# Patient Record
Sex: FEMALE | Race: ASIAN | URBAN - METROPOLITAN AREA
[De-identification: names, ages, dates, MRNs, and addresses within clinical notes are randomized per-mention and may not be internally consistent; named-entity substitution may affect disease eponyms.]

---

## 2017-05-31 ENCOUNTER — OFFICE VISIT (OUTPATIENT)
Dept: FAMILY MEDICINE | Facility: CLINIC | Age: 42
End: 2017-05-31
Payer: COMMERCIAL

## 2017-05-31 VITALS
DIASTOLIC BLOOD PRESSURE: 76 MMHG | TEMPERATURE: 98.5 F | HEART RATE: 83 BPM | OXYGEN SATURATION: 97 % | BODY MASS INDEX: 26.98 KG/M2 | WEIGHT: 146.6 LBS | HEIGHT: 62 IN | SYSTOLIC BLOOD PRESSURE: 115 MMHG

## 2017-05-31 DIAGNOSIS — Z00.00 ROUTINE GENERAL MEDICAL EXAMINATION AT A HEALTH CARE FACILITY: ICD-10-CM

## 2017-05-31 DIAGNOSIS — Z12.31 ENCOUNTER FOR SCREENING MAMMOGRAM FOR BREAST CANCER: Primary | ICD-10-CM

## 2017-05-31 LAB
CHOLEST SERPL-MCNC: 182 MG/DL
GLUCOSE SERPL-MCNC: 85 MG/DL (ref 70–99)
HDLC SERPL-MCNC: 70 MG/DL
LDLC SERPL CALC-MCNC: 88 MG/DL
NONHDLC SERPL-MCNC: 112 MG/DL
TRIGL SERPL-MCNC: 118 MG/DL

## 2017-05-31 PROCEDURE — 82947 ASSAY GLUCOSE BLOOD QUANT: CPT | Performed by: INTERNAL MEDICINE

## 2017-05-31 PROCEDURE — 36415 COLL VENOUS BLD VENIPUNCTURE: CPT | Performed by: INTERNAL MEDICINE

## 2017-05-31 PROCEDURE — 80061 LIPID PANEL: CPT | Performed by: INTERNAL MEDICINE

## 2017-05-31 PROCEDURE — 99396 PREV VISIT EST AGE 40-64: CPT | Performed by: INTERNAL MEDICINE

## 2017-05-31 RX ORDER — NORGESTIMATE AND ETHINYL ESTRADIOL 0.25-0.035
1 KIT ORAL DAILY
Qty: 84 TABLET | Refills: 4 | Status: SHIPPED | OUTPATIENT
Start: 2017-05-31 | End: 2020-01-02

## 2017-05-31 NOTE — NURSING NOTE
"Chief Complaint   Patient presents with     Physical       Initial /76 (BP Location: Right arm, Patient Position: Chair, Cuff Size: Adult Regular)  Pulse 83  Temp 98.5  F (36.9  C) (Tympanic)  Ht 5' 1.75\" (1.568 m)  Wt 146 lb 9.6 oz (66.5 kg)  SpO2 97%  BMI 27.03 kg/m2 Estimated body mass index is 27.03 kg/(m^2) as calculated from the following:    Height as of this encounter: 5' 1.75\" (1.568 m).    Weight as of this encounter: 146 lb 9.6 oz (66.5 kg).  Medication Reconciliation: complete   Lesa HARRELL CMA (AAMA)    "

## 2017-05-31 NOTE — MR AVS SNAPSHOT
After Visit Summary   5/31/2017    Michael Marmolejo    MRN: [id number]           Patient Information     Date Of Birth          1975        Visit Information        Provider Department      5/31/2017 9:40 AM Carlos Albetro Alas MD [facility]        Today's Diagnoses     Encounter for screening mammogram for breast cancer    -  1    Routine general medical examination at a health care facility          Care Instructions      Preventive Health Recommendations  Female Ages 40 to 49    Yearly exam:     See your health care provider every year in order to  1. Review health changes.   2. Discuss preventive care.    3. Review your medicines if your doctor prescribed any.      Get a Pap test every three years (unless you have an abnormal result and your provider advises testing more often).      If you get Pap tests with HPV test, you only need to test every 5 years, unless you have an abnormal result. You do not need a Pap test if your uterus was removed (hysterectomy) and you have not had cancer.      You should be tested each year for STDs (sexually transmitted diseases), if you're at risk.       Ask your doctor if you should have a mammogram.      Have a colonoscopy (test for colon cancer) if someone in your family has had colon cancer or polyps before age 50.       Have a cholesterol test every 5 years.       Have a diabetes test (fasting glucose) after age 45. If you are at risk for diabetes, you should have this test every 3 years.    Shots: Get a flu shot each year. Get a tetanus shot every 10 years.     Nutrition:     Eat at least 5 servings of fruits and vegetables each day.    Eat whole-grain bread, whole-wheat pasta and brown rice instead of white grains and rice.    Talk to your provider about Calcium and Vitamin D.     Lifestyle    Exercise at least 150 minutes a week (an average of 30 minutes a day, 5 days a week). This will help you control your weight and prevent disease.    Limit  "alcohol to one drink per day.    No smoking.     Wear sunscreen to prevent skin cancer.    See your dentist every six months for an exam and cleaning.          Follow-ups after your visit        Future tests that were ordered for you today     Open Future Orders        Priority Expected Expires Ordered    *MA Screening Digital Bilateral Routine  2018            Who to contact     If you have questions or need follow up information about today's clinic visit or your schedule please contact [facility] directly at [phone].  Normal or non-critical lab and imaging results will be communicated to you by MOMENTFACE SROhart, letter or phone within 4 business days after the clinic has received the results. If you do not hear from us within 7 days, please contact the clinic through Riptide IOt or phone. If you have a critical or abnormal lab result, we will notify you by phone as soon as possible.  Submit refill requests through Confidex or call your pharmacy and they will forward the refill request to us. Please allow 3 business days for your refill to be completed.          Additional Information About Your Visit        Confidex Information     Confidex lets you send messages to your doctor, view your test results, renew your prescriptions, schedule appointments and more. To sign up, go to [email or web address] . Click on \"Log in\" on the left side of the screen, which will take you to the Welcome page. Then click on \"Sign up Now\" on the right side of the page.     You will be asked to enter the access code listed below, as well as some personal information. Please follow the directions to create your username and password.     Your access code is: [email or web address]  Expires: 2017  9:56 AM     Your access code will  in 90 days. If you need help or a new code, please call your [facility] or [phone].        Care EveryWhere ID     This is your Care EveryWhere ID. This could be used by other " "organizations to access your [place] medical records  [id number]        Your Vitals Were     Pulse Temperature Height Pulse Oximetry BMI (Body Mass Index)       83 98.5  F (36.9  C) (Tympanic) 5' 1.75\" (1.568 m) 97% 27.03 kg/m2        Blood Pressure from Last 3 Encounters:   05/31/17 115/76   07/22/15 122/81   02/07/08 100/60    Weight from Last 3 Encounters:   05/31/17 146 lb 9.6 oz (66.5 kg)   07/22/15 147 lb (66.7 kg)   02/07/08 137 lb 12.8 oz (62.5 kg)              We Performed the Following     Glucose     Lipid Profile          Today's Medication Changes          These changes are accurate as of: 5/31/17  9:56 AM.  If you have any questions, ask your nurse or doctor.               Stop taking these medicines if you haven't already. Please contact your care team if you have questions.     PRENATAL VITAMIN TABS   OR   Stopped by:  Carlos Alberto Alas MD                Where to get your medicines      These medications were sent to [facility] [place], [place] - [address]  [address] [facility] [address]     Phone:  [phone]     norgestimate-ethinyl estradiol 0.25-35 MG-MCG per tablet                Primary Care Provider Office Phone # Fax #    Camille Wilson [phone]       [facility] [address] [facility] [address]        Thank you!     Thank you for choosing [facility]  for your care. Our goal is always to provide you with excellent care. Hearing back from our patients is one way we can continue to improve our services. Please take a few minutes to complete the written survey that you may receive in the mail after your visit with us. Thank you!             Your Updated Medication List - Protect others around you: Learn how to safely use, store and throw away your medicines at www.disposemymeds.org.          This list is accurate as of: 5/31/17  9:56 AM.  Always use your most recent med list.                   Brand Name Dispense " Instructions for use    CALCIUM 600 PO          norgestimate-ethinyl estradiol 0.25-35 MG-MCG per tablet    SPRINTEC 28    84 tablet    Take 1 tablet by mouth daily

## 2017-05-31 NOTE — LETTER
[facility]  [address] [facility] [place] [address]  Phone: [phone]    May 31, 2017    Michael Marmolejo  [address] [place] [address]          Dear Ms. Marmolejo,    The results of your recent lab tests were : cholesterol and blood sugar look great.   Component      Latest Ref Rng & Units 5/31/2017   Cholesterol      <200 mg/dL 182   Triglycerides      <150 mg/dL 118   HDL Cholesterol      >49 mg/dL 70   LDL Cholesterol Calculated      <100 mg/dL 88   Non HDL Cholesterol      <130 mg/dL 112   Glucose      70 - 99 mg/dL 85    If you have any further questions or problems, please contact our office.    Sincerely,      Veronica Alas MD / cb

## 2017-06-19 ENCOUNTER — HOSPITAL ENCOUNTER (OUTPATIENT)
Dept: MAMMOGRAPHY | Facility: CLINIC | Age: 42
Discharge: HOME | End: 2017-06-19
Attending: INTERNAL MEDICINE | Admitting: INTERNAL MEDICINE
Payer: COMMERCIAL

## 2017-06-19 DIAGNOSIS — Z12.31 ENCOUNTER FOR SCREENING MAMMOGRAM FOR BREAST CANCER: ICD-10-CM

## 2017-06-19 PROCEDURE — G0202 SCR MAMMO BI INCL CAD: HCPCS

## 2018-09-24 ENCOUNTER — HEALTH MAINTENANCE LETTER (OUTPATIENT)
Age: 43
End: 2018-09-24

## 2019-12-20 NOTE — PROGRESS NOTES
December 20, 2019      WORK      RE:   Ashli King  [address]  [address]  [place] [address]    This is to certify that Ashli King has been under my care from 12/20/2019 and is unable to return to work until reevaluation on 12/27/2019.        SIGNATURE: __________________________________________                                                     BRIDGER Cooper APNP  [address] [place] [address]  [place] [address]  Phone: [phone]        SUBJECTIVE:     CC: Michael Marmolejo is an 42 year old woman who presents for preventive health visit.     Healthy Habits:    Do you get at least three servings of calcium containing foods daily (dairy, green leafy vegetables, etc.)? yes    Amount of exercise or daily activities, outside of work: walks every day    Problems taking medications regularly not applicable    Medication side effects: No    Have you had an eye exam in the past two years? yes    Do you see a dentist twice per year? no    Do you have sleep apnea, excessive snoring or daytime drowsiness?no        NO other concerns    Today's PHQ-2 Score:   PHQ-2 ( 1999 Pfizer) 5/31/2017 7/22/2015   Q1: Little interest or pleasure in doing things 0 0   Q2: Feeling down, depressed or hopeless 0 0   PHQ-2 Score 0 0       Abuse: Current or Past(Physical, Sexual or Emotional)- No  Do you feel safe in your environment - Yes    Social History   Substance Use Topics     Smoking status: Never Smoker     Smokeless tobacco: Not on file     Alcohol use No     The patient does not drink >3 drinks per day nor >7 drinks per week.    No results for input(s): CHOL, HDL, LDL, TRIG, CHOLHDLRATIO, NHDL in the last 53292 hours.    Reviewed orders with patient.  Reviewed health maintenance and updated orders accordingly - Yes    Mammo Decision Support:  Patient under age 50, mutual decision reflected in health maintenance.      Pertinent mammograms are reviewed under the imaging tab.  History of abnormal Pap smear: NO - age 30-65 PAP every 5 years with negative HPV co-testing recommended    Reviewed and updated as needed this visit by clinical staff  Tobacco  Allergies  Med Hx  Surg Hx  Fam Hx  Soc Hx        Reviewed and updated as needed this visit by Provider        History reviewed. No pertinent past medical history.   Past Surgical History:   Procedure Laterality Date     C/SECTION, LOW TRANSVERSE  7/2007       ROS:  C: NEGATIVE for fever, chills, change in weight  I:  "NEGATIVE for worrisome rashes, moles or lesions  E: NEGATIVE for vision changes or irritation  ENT: NEGATIVE for ear, mouth and throat problems  R: NEGATIVE for significant cough or SOB  B: NEGATIVE for masses, tenderness or discharge  CV: NEGATIVE for chest pain, palpitations or peripheral edema  GI: NEGATIVE for nausea, abdominal pain, heartburn, or change in bowel habits  : NEGATIVE for unusual urinary or vaginal symptoms. Periods are regular.  M: NEGATIVE for significant arthralgias or myalgia except for some pain and bruising on the left side of the body after falling down the stairs a couple weeks ago  N: NEGATIVE for weakness, dizziness or paresthesias      Current Outpatient Prescriptions   Medication Sig Dispense Refill     Calcium Carbonate (CALCIUM 600 PO)        norgestimate-ethinyl estradiol (SPRINTEC 28) 0.25-35 MG-MCG per tablet Take 1 tablet by mouth daily 84 tablet 4     [DISCONTINUED] norgestimate-ethinyl estradiol (SPRINTEC 28) 0.25-35 MG-MCG per tablet Take 1 tablet by mouth daily 28 tablet 0     No Known Allergies  OBJECTIVE:     /76 (BP Location: Right arm, Patient Position: Chair, Cuff Size: Adult Regular)  Pulse 83  Temp 98.5  F (36.9  C) (Tympanic)  Ht 5' 1.75\" (1.568 m)  Wt 146 lb 9.6 oz (66.5 kg)  SpO2 97%  BMI 27.03 kg/m2  EXAM:  GENERAL: healthy, alert and no distress  EYES: Eyes grossly normal to inspection, PERRL and conjunctivae and sclerae normal  HENT: ear canals and TM's normal, nose and mouth without ulcers or lesions  NECK: no adenopathy, no asymmetry, masses, or scars and thyroid normal to palpation  RESP: lungs clear to auscultation - no rales, rhonchi or wheezes  BREAST: declined  CV: regular rate and rhythm, normal S1 S2, no S3 or S4, no murmur, click or rub, no peripheral edema and peripheral pulses strong  ABDOMEN: soft, nontender, no hepatosplenomegaly, no masses and bowel sounds normal  MS: no gross musculoskeletal defects noted, no edema  SKIN: no " "suspicious lesions or rashes on skin visible on clothed exam, she declines full skin exam  NEURO: Normal strength and tone, mentation intact and speech normal  PSYCH: mentation appears normal, affect normal/bright    ASSESSMENT/PLAN:     1. Routine general medical examination at a health care facility  2. Encounter for screening mammogram for breast cancer      Pap smear: next due in 2018    Immunizations: UTD    Lab Studies: Cholesterol and glucose today    Mammogram: discussed variable recommendations for women in their 40s and she would like to continue mammogram screening, ordered    Colonoscopy: age 50     OCP refilled, no concerns with current pill.      - norgestimate-ethinyl estradiol (SPRINTEC 28) 0.25-35 MG-MCG per tablet; Take 1 tablet by mouth daily  Dispense: 84 tablet; Refill: 4      COUNSELING:   Reviewed preventive health counseling, as reflected in patient instructions     reports that she has never smoked. She does not have any smokeless tobacco history on file.    Estimated body mass index is 27.03 kg/(m^2) as calculated from the following:    Height as of this encounter: 5' 1.75\" (1.568 m).    Weight as of this encounter: 146 lb 9.6 oz (66.5 kg).   Weight management plan: Discussed healthy diet and exercise guidelines and patient will follow up in 12 months in clinic to re-evaluate.      Carlos Alberto Alas MD  [facility]  "

## 2020-01-02 ENCOUNTER — OFFICE VISIT (OUTPATIENT)
Dept: FAMILY MEDICINE | Facility: CLINIC | Age: 45
End: 2020-01-02
Payer: COMMERCIAL

## 2020-01-02 VITALS
RESPIRATION RATE: 16 BRPM | DIASTOLIC BLOOD PRESSURE: 64 MMHG | HEART RATE: 54 BPM | SYSTOLIC BLOOD PRESSURE: 102 MMHG | WEIGHT: 149.4 LBS | BODY MASS INDEX: 27.49 KG/M2 | OXYGEN SATURATION: 98 % | HEIGHT: 62 IN | TEMPERATURE: 98.3 F

## 2020-01-02 DIAGNOSIS — R63.5 WEIGHT GAIN: ICD-10-CM

## 2020-01-02 DIAGNOSIS — Z23 NEED FOR PROPHYLACTIC VACCINATION AND INOCULATION AGAINST INFLUENZA: ICD-10-CM

## 2020-01-02 DIAGNOSIS — Z30.41 ENCOUNTER FOR SURVEILLANCE OF CONTRACEPTIVE PILLS: ICD-10-CM

## 2020-01-02 DIAGNOSIS — Z00.00 ROUTINE GENERAL MEDICAL EXAMINATION AT A HEALTH CARE FACILITY: Primary | ICD-10-CM

## 2020-01-02 LAB
CHOLEST SERPL-MCNC: 119 MG/DL
HDLC SERPL-MCNC: 50 MG/DL
LDLC SERPL CALC-MCNC: 52 MG/DL
NONHDLC SERPL-MCNC: 69 MG/DL
TRIGL SERPL-MCNC: 85 MG/DL
TSH SERPL DL<=0.005 MIU/L-ACNC: 1.71 MU/L (ref 0.4–4)

## 2020-01-02 PROCEDURE — 84443 ASSAY THYROID STIM HORMONE: CPT | Performed by: NURSE PRACTITIONER

## 2020-01-02 PROCEDURE — 90471 IMMUNIZATION ADMIN: CPT | Performed by: NURSE PRACTITIONER

## 2020-01-02 PROCEDURE — 36415 COLL VENOUS BLD VENIPUNCTURE: CPT | Performed by: NURSE PRACTITIONER

## 2020-01-02 PROCEDURE — 99396 PREV VISIT EST AGE 40-64: CPT | Mod: 25 | Performed by: NURSE PRACTITIONER

## 2020-01-02 PROCEDURE — 87624 HPV HI-RISK TYP POOLED RSLT: CPT | Performed by: NURSE PRACTITIONER

## 2020-01-02 PROCEDURE — 90686 IIV4 VACC NO PRSV 0.5 ML IM: CPT | Performed by: NURSE PRACTITIONER

## 2020-01-02 PROCEDURE — 80061 LIPID PANEL: CPT | Performed by: NURSE PRACTITIONER

## 2020-01-02 PROCEDURE — G0145 SCR C/V CYTO,THINLAYER,RESCR: HCPCS | Performed by: NURSE PRACTITIONER

## 2020-01-02 RX ORDER — NORGESTIMATE AND ETHINYL ESTRADIOL 0.25-0.035
1 KIT ORAL DAILY
Qty: 84 TABLET | Refills: 4 | Status: SHIPPED | OUTPATIENT
Start: 2020-01-02 | End: 2021-02-25

## 2020-01-02 ASSESSMENT — MIFFLIN-ST. JEOR: SCORE: 1276.95

## 2020-01-02 NOTE — LETTER
January 2, 2020      Sheliakalpesh Marmolejo  [address] [address]        Dear ,    We are writing to inform you of your test results.    Cholesterol level normal.   Thyroid function normal.   Resulted Orders   Lipid panel reflex to direct LDL Fasting   Result Value Ref Range    Cholesterol 119 <200 mg/dL    Triglycerides 85 <150 mg/dL      Comment:      Fasting specimen    HDL Cholesterol 50 >49 mg/dL    LDL Cholesterol Calculated 52 <100 mg/dL      Comment:      Desirable:       <100 mg/dl    Non HDL Cholesterol 69 <130 mg/dL   TSH with free T4 reflex   Result Value Ref Range    TSH 1.71 0.40 - 4.00 mU/L       If you have any questions or concerns, please call the clinic at the number listed above.       Sincerely,        HOA Albrecht CNP

## 2020-01-02 NOTE — LETTER
January 9, 2020    Sheliakalpesh Marmolejo  [address] [facility] [place] [address]    Dear ,  This letter is regarding your recent Pap smear (cervical cancer screening) and Human Papillomavirus (HPV) test.  We are happy to inform you that your Pap smear result is normal. Cervical cancer is closely linked with certain types of HPV. Your results showed no evidence of high-risk HPV.  We recommend you have your next PAP smear in 3 years.  You will still need to return to the clinic every year for an annual exam and other preventive tests.  If you have additional questions regarding this result, please call our registered nurse, Marci at [phone].  Sincerely,    HOA Albrecht CNP //katherine

## 2020-01-02 NOTE — PROGRESS NOTES
SUBJECTIVE:   CC: Michael Marmolejo is an 44 year old woman who presents for preventive health visit.     Chief Complaint   Patient presents with     Physical     with pap      Orders     Mammogram      Blood Draw     Fasting for labs      Refill Request     BC        Healthy Habits:    Getting at least 3 servings of Calcium per day:  Yes    Bi-annual eye exam:  Yes    Dental care twice a year:  NO    Sleep apnea or symptoms of sleep apnea:  None    Diet:  Regular (no restrictions)    Frequency of exercise:  None    Duration of exercise:  N/A    Taking medications regularly:  Yes    Barriers to taking medications:  None    Medication side effects:  None    PHQ-2 Total Score:    Additional concerns today:  No    Today's PHQ-2 Score:   PHQ-2 ( 1999 Pfizer) 5/31/2017   Q1: Little interest or pleasure in doing things 0   Q2: Feeling down, depressed or hopeless 0   PHQ-2 Score 0       Abuse: Current or Past(Physical, Sexual or Emotional)- No  Do you feel safe in your environment? Yes    Social History     Tobacco Use     Smoking status: Never Smoker   Substance Use Topics     Alcohol use: No     If you drink alcohol do you typically have >3 drinks per day or >7 drinks per week? No    Alcohol Use 5/31/2017   Prescreen: >3 drinks/day or >7 drinks/week? The patient does not drink >3 drinks per day nor >7 drinks per week.       Reviewed orders with patient.  Reviewed health maintenance and updated orders accordingly - Yes  Lab work is in process  Labs reviewed in [facility]    Mammogram Screening: Patient under age 50, mutual decision reflected in health maintenance.      Pertinent mammograms are reviewed under the imaging tab.  History of abnormal Pap smear: NO - age 30- 65 PAP every 3 years recommended  PAP / HPV 7/22/2015 2/7/2008 12/21/2006   PAP NIL NIL NIL     Reviewed and updated as needed this visit by clinical staff  Tobacco  Allergies  Meds  Med Hx  Surg Hx  Fam Hx  Soc Hx        Reviewed and updated as needed this  visit by Provider            Review of Systems  CONSTITUTIONAL:POSITIVE  for weight gain and NEGATIVE  for anorexia, arthralgias, chills, fatigue, malaise, myalgias, sweats and weight loss  INTEGUMENTARU/SKIN: NEGATIVE for worrisome rashes, moles or lesions  EYES: NEGATIVE for vision changes or irritation  ENT: NEGATIVE for ear, mouth and throat problems  RESP: NEGATIVE for significant cough or SOB  BREAST: NEGATIVE for masses, tenderness or discharge  CV: NEGATIVE for chest pain, palpitations or peripheral edema  GI: NEGATIVE for nausea, abdominal pain, heartburn, or change in bowel habits  : NEGATIVE for unusual urinary or vaginal symptoms. Periods are regular.  MUSCULOSKELETAL: NEGATIVE for significant arthralgias or myalgia  NEURO: NEGATIVE for weakness, dizziness or paresthesias  PSYCHIATRIC: NEGATIVE for changes in mood or affect     OBJECTIVE:   LMP 12/31/2019   Physical Exam  GENERAL: healthy, alert and no distress  EYES: Eyes grossly normal to inspection, PERRL and conjunctivae and sclerae normal  HENT: ear canals and TM's normal, nose and mouth without ulcers or lesions  NECK: no adenopathy, no asymmetry, masses, or scars and thyroid normal to palpation  RESP: lungs clear to auscultation - no rales, rhonchi or wheezes  BREAST: normal without masses, tenderness or nipple discharge and no palpable axillary masses or adenopathy  CV: regular rate and rhythm, normal S1 S2, no S3 or S4, no murmur, click or rub, no peripheral edema and peripheral pulses strong   (female): normal female external genitalia, normal urethral meatus, vaginal mucosa, normal cervix/adnexa/uterus without masses or discharge  MS: no gross musculoskeletal defects noted, no edema  SKIN: no suspicious lesions or rashes  PSYCH: mentation appears normal, affect normal/bright    Diagnostic Test Results:  Labs reviewed in Epic    ASSESSMENT/PLAN:   1. Routine general medical examination at a health care facility    - Pap imaged thin layer  "screen with HPV - recommended age 30 - 65 years (select HPV order below)  - HPV High Risk Types DNA Cervical  - Lipid panel reflex to direct LDL Fasting  - *MA Screening Digital Bilateral; Future    2. Need for prophylactic vaccination and inoculation against influenza    - INFLUENZA VACCINE IM > 6 MONTHS VALENT IIV4 [94794]  - Vaccine Administration, Initial [42309]    3. Encounter for surveillance of contraceptive pills    - norgestimate-ethinyl estradiol (SPRINTEC 28) 0.25-35 MG-MCG tablet; Take 1 tablet by mouth daily  Dispense: 84 tablet; Refill: 4    4. Weight gain    - TSH with free T4 reflex    COUNSELING:  Reviewed preventive health counseling, as reflected in patient instructions       Regular exercise       Healthy diet/nutrition    Estimated body mass index is 27.03 kg/m  as calculated from the following:    Height as of 5/31/17: 1.568 m (5' 1.75\").    Weight as of 5/31/17: 66.5 kg (146 lb 9.6 oz).    Weight management plan: Discussed healthy diet and exercise guidelines     reports that she has never smoked. She does not have any smokeless tobacco history on file.      Counseling Resources:  ATP IV Guidelines  Pooled Cohorts Equation Calculator  Breast Cancer Risk Calculator  FRAX Risk Assessment  ICSI Preventive Guidelines  Dietary Guidelines for Americans, 2010  USDA's MyPlate  ASA Prophylaxis  Lung CA Screening    HOA Albrecht [facility]  "

## 2020-01-06 LAB
COPATH REPORT: NORMAL
PAP: NORMAL

## 2020-01-08 LAB
FINAL DIAGNOSIS: NORMAL
HPV HR 12 DNA CVX QL NAA+PROBE: NEGATIVE
HPV16 DNA SPEC QL NAA+PROBE: NEGATIVE
HPV18 DNA SPEC QL NAA+PROBE: NEGATIVE
SPECIMEN DESCRIPTION: NORMAL
SPECIMEN SOURCE CVX/VAG CYTO: NORMAL

## 2020-01-27 ENCOUNTER — HOSPITAL ENCOUNTER (OUTPATIENT)
Dept: MAMMOGRAPHY | Facility: CLINIC | Age: 45
Discharge: HOME | End: 2020-01-27
Attending: NURSE PRACTITIONER | Admitting: NURSE PRACTITIONER
Payer: COMMERCIAL

## 2020-01-27 DIAGNOSIS — Z12.31 VISIT FOR SCREENING MAMMOGRAM: ICD-10-CM

## 2020-01-27 PROCEDURE — 77063 BREAST TOMOSYNTHESIS BI: CPT

## 2020-05-22 ENCOUNTER — NURSE TRIAGE (OUTPATIENT)
Dept: NURSING | Facility: CLINIC | Age: 45
End: 2020-05-22

## 2020-05-22 ENCOUNTER — VIRTUAL VISIT (OUTPATIENT)
Dept: FAMILY MEDICINE | Facility: CLINIC | Age: 45
End: 2020-05-22
Payer: COMMERCIAL

## 2020-05-22 DIAGNOSIS — R21 RASH: Primary | ICD-10-CM

## 2020-05-22 PROCEDURE — 99213 OFFICE O/P EST LOW 20 MIN: CPT | Mod: 95 | Performed by: INTERNAL MEDICINE

## 2020-05-22 RX ORDER — TRIAMCINOLONE ACETONIDE 1 MG/G
CREAM TOPICAL 2 TIMES DAILY
Qty: 45 G | Refills: 1 | Status: SHIPPED | OUTPATIENT
Start: 2020-05-22 | End: 2020-06-05

## 2020-05-22 NOTE — PROGRESS NOTES
"Michael Marmolejo is a 45 year old female who is being evaluated via a billable video visit.      The patient has been notified of following:     \"This video visit will be conducted via a call between you and your physician/provider. We have found that certain health care needs can be provided without the need for an in-person physical exam.  This service lets us provide the care you need with a video conversation.  If a prescription is necessary we can send it directly to your pharmacy.  If lab work is needed we can place an order for that and you can then stop by our lab to have the test done at a later time.    Video visits are billed at different rates depending on your insurance coverage.  Please reach out to your insurance provider with any questions.    If during the course of the call the physician/provider feels a video visit is not appropriate, you will not be charged for this service.\"    Patient has given verbal consent for Video visit? Yes    How would you like to obtain your AVS? Mail a copy    Patient would like the video invitation sent by: Text to cell phone: [phone]    Will anyone else be joining your video visit? No    Subjective     Michael Marmloejo is a 45 year old female who presents today via video visit for the following health issues:    HPI  Rash  Onset: 1 week    Description: Pt states that it started on the right side of her face and has been spreading to the left side of her face. It is now on her neck and clavicle on the left. She also has a sore on her Left Lower cheek.   Starts as blisters then opens up.   Location: Face, Neck and left clavicle.  Started on right cheek then spread to left then to left shoulder/upper chest  Character: raised, painful, burning, red, Hot to the touch  Itching (Pruritis): YES    Progression of Symptoms:  worsening    Accompanying Signs & Symptoms:  Fever: no   Body aches or joint pain: no   Sore throat symptoms: no   Recent cold symptoms: no   Watery " "discharge, no pus    History:   Previous similar rash: no     Precipitating factors:   Exposure to similar rash: no   New exposures: had been working outside- had some rash prior to this but then rash got worse afterward, was wearing a mask  Recent travel: no     Alleviating factors:  Benadryl helped with the itching and swelling    Therapies Tried and outcome: Cortisone did not help, Benadryl helped itch and facial swelling.  Benadryl doesn't make her too tired during the day      Video Start Time: 1:13pm      Patient Active Problem List   Diagnosis     Previous  delivery, delivered     Past Surgical History:   Procedure Laterality Date     C/SECTION, LOW TRANSVERSE  2007       Social History     Tobacco Use     Smoking status: Never Smoker     Smokeless tobacco: Never Used   Substance Use Topics     Alcohol use: Yes     Comment: rare     Family History   Problem Relation Age of Onset     Asthma Mother      Cancer Father         liver, ETOH abuse         Current Outpatient Medications   Medication Sig Dispense Refill     Calcium Carbonate (CALCIUM 600 PO)        norgestimate-ethinyl estradiol (SPRINTEC 28) 0.25-35 MG-MCG tablet Take 1 tablet by mouth daily 84 tablet 4     triamcinolone (KENALOG) 0.1 % external cream Apply topically 2 times daily for 14 days 45 g 1     No Known Allergies    Reviewed and updated as needed this visit by Provider         Review of Systems   Constitutional, derm systems are negative, except as otherwise noted.      Objective    There were no vitals taken for this visit.  Estimated body mass index is 27.55 kg/m  as calculated from the following:    Height as of 20: 1.568 m (5' 1.75\").    Weight as of 20: 67.8 kg (149 lb 6.4 oz).  Physical Exam     GENERAL: Healthy, alert and no distress  EYES: Eyes grossly normal to inspection.  No discharge or erythema, or obvious scleral/conjunctival abnormalities.  RESP: No audible wheeze, cough, or visible cyanosis.  No visible " retractions or increased work of breathing.    SKIN: Red papular rash on both cheeks and going down left jaw line and onto upper left chest with some open sores  NEURO: Cranial nerves grossly intact.  Mentation and speech appropriate for age.  PSYCH: Mentation appears normal, affect normal/bright, judgement and insight intact, normal speech and appearance well-groomed.                Assessment & Plan     1. Rash    Seems most likely allergic contact dermatitis due to itch and reported vesicles.  No obvious plan exposures, had been tolerating that type of mask before.  They have changed to a different type of mask at this point.  No fevers or purulent drainage, and swelling improved with Benadryl, so less likely bacterial.  Will try triamcinolone and advised continuing Benadryl as that has been helping with itch and reducing swelling.     - triamcinolone (KENALOG) 0.1 % external cream; Apply topically 2 times daily for 14 days  Dispense: 45 g; Refill: 1         Return in about 1 week (around 5/29/2020) for Call if not improving or new symptoms develop (fevers, draining pus).    Carlos Alberto Alas MD  [facility]      Video-Visit Details    Type of service:  Video Visit    Video End Time:1:20pm    Originating Location (pt. Location): Home    Distant Location (provider location):  [facility]    Platform used for Video Visit: [employer]    No follow-ups on file.       Carlos Alberto Alas MD

## 2020-05-22 NOTE — TELEPHONE ENCOUNTER
"Spouse calling; patient present.  States patient has a rash on face that started a week ago.  Rash started on cheeks and spread down toward neck.  Rash is \"bumpy, itchy and burning\".  Cortisone cream provides no relief; Benadryl has helped as itchiness isn't as bad as it was.  One spot has \"liquid oozing out\".  Patient was working outside and was wearing a mask; rash is pretty confined to mask area.  Transferred to scheduling for Video Visit with provider.    Additional Information    Negative: Athlete's foot suspected (e.g., itchy rash of feet, especially 3rd-4th web spaces)    Negative: Insect bites suspected    Negative: Jock Itch suspected (e.g., itchy rash on upper inner thigh)    Negative: Public lice suspected (e.g., genital itching and lice or nits are seen)    Negative: Poison ivy, oak, or sumac suspected (e.g., itchy rash after contact with poison ivy)    Negative: Genital itching - female    Negative: Genital itching - male    Negative: Rectal (anus) itching    Negative: Localized rash also present    Negative: Patient sounds very sick or weak to the triager    Looks infected (e.g., spreading redness, red streak, pus)    Protocols used: ITCHING - [id number]      "

## 2020-12-06 ENCOUNTER — HEALTH MAINTENANCE LETTER (OUTPATIENT)
Age: 45
End: 2020-12-06

## 2021-02-04 ENCOUNTER — OFFICE VISIT (OUTPATIENT)
Dept: FAMILY MEDICINE | Facility: CLINIC | Age: 46
End: 2021-02-04
Payer: COMMERCIAL

## 2021-02-04 VITALS
OXYGEN SATURATION: 97 % | WEIGHT: 147 LBS | SYSTOLIC BLOOD PRESSURE: 122 MMHG | BODY MASS INDEX: 27.05 KG/M2 | RESPIRATION RATE: 16 BRPM | TEMPERATURE: 98.7 F | DIASTOLIC BLOOD PRESSURE: 84 MMHG | HEART RATE: 86 BPM | HEIGHT: 62 IN

## 2021-02-04 DIAGNOSIS — Z23 NEED FOR PROPHYLACTIC VACCINATION AND INOCULATION AGAINST INFLUENZA: ICD-10-CM

## 2021-02-04 DIAGNOSIS — G44.209 TENSION HEADACHE: ICD-10-CM

## 2021-02-04 DIAGNOSIS — Z00.00 ANNUAL PHYSICAL EXAM: Primary | ICD-10-CM

## 2021-02-04 LAB
ANION GAP SERPL CALCULATED.3IONS-SCNC: 1 MMOL/L (ref 3–14)
BUN SERPL-MCNC: 10 MG/DL (ref 7–30)
CALCIUM SERPL-MCNC: 8.9 MG/DL (ref 8.5–10.1)
CHLORIDE SERPL-SCNC: 108 MMOL/L (ref 94–109)
CHOLEST SERPL-MCNC: 210 MG/DL
CO2 SERPL-SCNC: 27 MMOL/L (ref 20–32)
CREAT SERPL-MCNC: 0.62 MG/DL (ref 0.52–1.04)
GFR SERPL CREATININE-BSD FRML MDRD: >90 ML/MIN/{1.73_M2}
GLUCOSE SERPL-MCNC: 79 MG/DL (ref 70–99)
HDLC SERPL-MCNC: 94 MG/DL
LDLC SERPL CALC-MCNC: 95 MG/DL
NONHDLC SERPL-MCNC: 116 MG/DL
POTASSIUM SERPL-SCNC: 4.1 MMOL/L (ref 3.4–5.3)
SODIUM SERPL-SCNC: 136 MMOL/L (ref 133–144)
TRIGL SERPL-MCNC: 105 MG/DL

## 2021-02-04 PROCEDURE — 80048 BASIC METABOLIC PNL TOTAL CA: CPT | Performed by: NURSE PRACTITIONER

## 2021-02-04 PROCEDURE — 90471 IMMUNIZATION ADMIN: CPT | Performed by: NURSE PRACTITIONER

## 2021-02-04 PROCEDURE — 99396 PREV VISIT EST AGE 40-64: CPT | Mod: 25 | Performed by: NURSE PRACTITIONER

## 2021-02-04 PROCEDURE — 90686 IIV4 VACC NO PRSV 0.5 ML IM: CPT | Performed by: NURSE PRACTITIONER

## 2021-02-04 PROCEDURE — 99213 OFFICE O/P EST LOW 20 MIN: CPT | Mod: 25 | Performed by: NURSE PRACTITIONER

## 2021-02-04 PROCEDURE — 36415 COLL VENOUS BLD VENIPUNCTURE: CPT | Performed by: NURSE PRACTITIONER

## 2021-02-04 PROCEDURE — 80061 LIPID PANEL: CPT | Performed by: NURSE PRACTITIONER

## 2021-02-04 ASSESSMENT — MIFFLIN-ST. JEOR: SCORE: 1265.04

## 2021-02-04 NOTE — PROGRESS NOTES
"   SUBJECTIVE:   CC: Michael Marmolejo is an 45 year old woman who presents for preventive health visit.     {Split Bill scripting  The purpose of this visit is to discuss your medical history and prevent health problems before you are sick. You may be responsible for a co-pay, coinsurance, or deductible if your visit today includes services such as checking on a sore throat, having an x-ray or lab test, or treating and evaluating a new or existing condition :712451}  Patient has been advised of split billing requirements and indicates understanding: {Yes and No:925033}  HPI  {Add if <65 person on Medicare  - Required Questions (Optional):340272}  {Outside tests to abstract? :647840}    {additional problems to add (Optional):248868}    Today's PHQ-2 Score:   PHQ-2 ( 1999 Pfizer) 5/31/2017   Q1: Little interest or pleasure in doing things 0   Q2: Feeling down, depressed or hopeless 0   PHQ-2 Score 0       Abuse: Current or Past (Physical, Sexual or Emotional) - { :786671}  Do you feel safe in your environment? { :601718}        Social History     Tobacco Use     Smoking status: Never Smoker     Smokeless tobacco: Never Used   Substance Use Topics     Alcohol use: Yes     Comment: rare     {Rooming Staff- Complete this question if Prescreen response is not shown below for today's visit. If you drink alcohol do you typically have >3 drinks per day or >7 drinks per week? (Optional):557796}    Alcohol Use 1/2/2020   Prescreen: >3 drinks/day or >7 drinks/week? No   {add AUDIT responses (Optional) (A score of 7 for adult men is an indication of hazardous drinking; a score of 8 or more is an indication of an alcohol use disorder.  A score of 7 or more for adult women is an indication of hazardous drinking or an alchohol use disorder):460130}    Any new diagnosis of family breast, ovarian, or bowel cancer? {If yes - repeat FHS-7 if not done today :773242::\"Yes\"} {Link to FHS-7 Assessment :304630}    Reviewed orders with " "patient.  Reviewed health maintenance and updated orders accordingly - { :426754::\"Yes\"}  {Chronicprobdata (optional):900916}    Breast CA Risk Screening:  No flowsheet data found.  {Pull in link for FHS-7 answers if completed after opening note (Optional) :817867}  {If any of the questions to the BCRA (FHS-7) are answered yes, consider ordering referral for genetic counseling (Optional) :989799::\"click delete button to remove this line now\"}  {AMB Mammogram Decision Support :015247}  Pertinent mammograms are reviewed under the imaging tab.    History of abnormal Pap smear: { :829089}  PAP / HPV Latest Ref Rng & Units 1/2/2020 7/22/2015 2/7/2008   PAP - NIL NIL NIL   HPV 16 DNA NEG:Negative Negative - -   HPV 18 DNA NEG:Negative Negative - -   OTHER HR HPV NEG:Negative Negative - -     Reviewed and updated as needed this visit by clinical staff                 Reviewed and updated as needed this visit by Provider                {HISTORY OPTIONS (Optional):181886}    Review of Systems  {FEMALE ROS (Optional):806890}     OBJECTIVE:   There were no vitals taken for this visit.  Physical Exam  {Exam Choices (Optional):038798}    {Diagnostic Test Results (Optional):870219::\"Diagnostic Test Results:\",\"Labs reviewed in Epic\"}    ASSESSMENT/PLAN:   {Diag Picklist:429443}    Patient has been advised of split billing requirements and indicates understanding: {YES / NO:762150::\"Yes\"}  COUNSELING:  {FEMALE COUNSELING MESSAGES:726327::\"Reviewed preventive health counseling, as reflected in patient instructions\"}    Estimated body mass index is 27.55 kg/m  as calculated from the following:    Height as of 1/2/20: 1.568 m (5' 1.75\").    Weight as of 1/2/20: 67.8 kg (149 lb 6.4 oz).    {Weight Management Plan (ACO) Complete if BMI is abnormal-  Ages 18-64  BMI >24.9.  Age 65+ with BMI <23 or >30 (Optional):005210}    She reports that she has never smoked. She has never used smokeless tobacco.      Counseling Resources:  ATP IV " Guidelines  Pooled Cohorts Equation Calculator  Breast Cancer Risk Calculator  BRCA-Related Cancer Risk Assessment: FHS-7 Tool  FRAX Risk Assessment  ICSI Preventive Guidelines  Dietary Guidelines for Americans, 2010  USDA's MyPlate  ASA Prophylaxis  Lung CA Screening    [facility]

## 2021-02-04 NOTE — PROGRESS NOTES
SUBJECTIVE:   CC: Michael Marmolejo is an 45 year old woman who presents for preventive health visit.     Chief Complaint   Patient presents with     Physical     fasting for labs     Headache     Imm/Inj     Flu Shot     Patient has been advised of split billing requirements and indicates understanding: Yes  Healthy Habits:    Getting at least 3 servings of Calcium per day:  Yes    Bi-annual eye exam:  Yes    Dental care twice a year:  Yes    Sleep apnea or symptoms of sleep apnea:  None    Diet:  Carbohydrate counting    Frequency of exercise:  6-7 days/week    Duration of exercise:  15-30 minutes    Taking medications regularly:  Yes    Barriers to taking medications:  None    Medication side effects:  None    PHQ-2 Total Score:    Additional concerns today:  Yes    Headache  Onset: about 5 days ago    Description:   Location: bilateral in the temporal area   Character: spinning feeling  Frequency:  Happened before menstruation   Duration:  If taking ibuprofen it lasted for an hour or two    Intensity: mild    Progression of Symptoms:  Just that one time.     Accompanying Signs & Symptoms:  Stiff neck: no   Neck or upper back pain: YES- sometimes lower back pain if shes bending down for a long time.   Fever: no   Sinus pressure: no   Nausea or vomiting: no   Dizziness: no   Numbness: no   Weakness: no   Visual changes: no     History:   Head trauma: no   Family history of migraines: no   Previous tests for headaches: no   Neurologist evaluations: saw a neurologist last year  Able to do daily activities: YES  Wake with a headaches: no   Do headaches wake you up: no   Daily pain medication use: no   Work/school stressors/changes: no     Precipitating factors:   Does light make it worse: no   Does sound make it worse: no     Alleviating factors:  Does sleep help: YES    Therapies Tried and outcome: Ibuprofen (Advil, Motrin) helps    Today's PHQ-2 Score:   PHQ-2 ( 1999 Pfizer) 2/4/2021   Q1: Little interest or  pleasure in doing things 0   Q2: Feeling down, depressed or hopeless 0   PHQ-2 Score 0       Abuse: Current or Past (Physical, Sexual or Emotional) - No  Do you feel safe in your environment? Yes    Social History     Tobacco Use     Smoking status: Never Smoker     Smokeless tobacco: Never Used   Substance Use Topics     Alcohol use: Yes     Comment: rare     If you drink alcohol do you typically have >3 drinks per day or >7 drinks per week? No    Alcohol Use 2/4/2021   Prescreen: >3 drinks/day or >7 drinks/week? No       Any new diagnosis of family breast, ovarian, or bowel cancer? No   Reviewed orders with patient.  Reviewed health maintenance and updated orders accordingly - Yes  BP Readings from Last 3 Encounters:   02/04/21 122/84   01/02/20 102/64   05/31/17 115/76    Wt Readings from Last 3 Encounters:   02/04/21 66.7 kg (147 lb)   01/02/20 67.8 kg (149 lb 6.4 oz)   05/31/17 66.5 kg (146 lb 9.6 oz)                    Breast CA Risk Screening:    Mammogram Screening: Recommended annual mammography  Pertinent mammograms are reviewed under the imaging tab.    History of abnormal Pap smear: NO - age 30- 65 PAP every 3 years recommended  PAP / HPV Latest Ref Rng & Units 1/2/2020 7/22/2015 2/7/2008   PAP - NIL NIL NIL   HPV 16 DNA NEG:Negative Negative - -   HPV 18 DNA NEG:Negative Negative - -   OTHER HR HPV NEG:Negative Negative - -     Reviewed and updated as needed this visit by clinical staff  Tobacco  Allergies  Meds   Med Hx  Surg Hx  Fam Hx  Soc Hx        Reviewed and updated as needed this visit by Provider  Tobacco                   Review of Systems  CONSTITUTIONAL: NEGATIVE for fever, chills, change in weight  INTEGUMENTARU/SKIN: NEGATIVE for worrisome rashes, moles or lesions  EYES: NEGATIVE for vision changes or irritation  ENT: NEGATIVE for ear, mouth and throat problems  RESP: NEGATIVE for significant cough or SOB  BREAST: NEGATIVE for masses, tenderness or discharge  CV: NEGATIVE for chest  "pain, palpitations or peripheral edema  GI: NEGATIVE for nausea, abdominal pain, heartburn, or change in bowel habits  : NEGATIVE for unusual urinary or vaginal symptoms. Periods are regular.  MUSCULOSKELETAL: NEGATIVE for significant arthralgias or myalgia  NEURO: POSITIVE for mild headaches   PSYCHIATRIC: NEGATIVE for changes in mood or affect     OBJECTIVE:   /84   Pulse 86   Temp 98.7  F (37.1  C) (Tympanic)   Resp 16   Ht 1.575 m (5' 2\")   Wt 66.7 kg (147 lb)   LMP 02/01/2021 (Exact Date)   SpO2 97%   Breastfeeding No   BMI 26.89 kg/m    Physical Exam  GENERAL: healthy, alert and no distress  EYES: Eyes grossly normal to inspection, PERRL and conjunctivae and sclerae normal  HENT: ear canals and TM's normal, nose and mouth without ulcers or lesions  NECK: no adenopathy, no asymmetry, masses, or scars and thyroid normal to palpation  RESP: lungs clear to auscultation - no rales, rhonchi or wheezes  CV: regular rate and rhythm, normal S1 S2, no S3 or S4, no murmur, click or rub, no peripheral edema and peripheral pulses strong  MS: no gross musculoskeletal defects noted, no edema  SKIN: no suspicious lesions or rashes  NEURO: Normal strength and tone, mentation intact and speech normal  PSYCH: mentation appears normal, affect normal/bright    Diagnostic Test Results:  Labs reviewed in Epic    ASSESSMENT/PLAN:   1. Annual physical exam    - Lipid panel reflex to direct LDL Fasting  - Basic metabolic panel  (Ca, Cl, CO2, Creat, Gluc, K, Na, BUN)    2. Tension headache  -mild tension headaches, lasting for one day, trigger-menses, headaches resolving with use of Tylenol, or Advil   -reassured patient  -recommended good hydration, can also try taking Advil the day before her period for prophylactic treatment     3. Need for prophylactic vaccination and inoculation against influenza    - INFLUENZA VACCINE IM > 6 MONTHS VALENT IIV4 [56059]    Patient has been advised of split billing requirements and " "indicates understanding: Yes  COUNSELING:  Reviewed preventive health counseling, as reflected in patient instructions       Regular exercise       Healthy diet/nutrition    Estimated body mass index is 26.89 kg/m  as calculated from the following:    Height as of this encounter: 1.575 m (5' 2\").    Weight as of this encounter: 66.7 kg (147 lb).    Weight management plan: Discussed healthy diet and exercise guidelines    She reports that she has never smoked. She has never used smokeless tobacco.      Counseling Resources:  ATP IV Guidelines  Pooled Cohorts Equation Calculator  Breast Cancer Risk Calculator  BRCA-Related Cancer Risk Assessment: FHS-7 Tool  FRAX Risk Assessment  ICSI Preventive Guidelines  Dietary Guidelines for Americans, 2010  USDA's MyPlate  ASA Prophylaxis  Lung CA Screening    [facility]  "

## 2021-02-23 DIAGNOSIS — Z30.41 ENCOUNTER FOR SURVEILLANCE OF CONTRACEPTIVE PILLS: ICD-10-CM

## 2021-02-23 NOTE — TELEPHONE ENCOUNTER
"Requested Prescriptions   Pending Prescriptions Disp Refills     MONO-LINYAH 0.25-35 MG-MCG tablet [Pharmacy Med Name: Mono-Linyah Oral Tablet 0.25-35 MG-MCG] 84 tablet 0     Sig: TAKE 1 TABLET BY MOUTH ONCE DAILY       Contraceptives Protocol Passed - 2/23/2021 10:07 AM        Passed - Patient is not a current smoker if age is 35 or older        Passed - Recent (12 mo) or future (30 days) visit within the authorizing provider's specialty     Patient has had an office visit with the authorizing provider or a provider within the authorizing providers department within the previous 12 mos or has a future within next 30 days. See \"Patient Info\" tab in inbasket, or \"Choose Columns\" in Meds & Orders section of the refill encounter.              Passed - Medication is active on med list        Passed - No active pregnancy on record        Passed - No positive pregnancy test in past 12 months             "

## 2021-02-25 RX ORDER — NORGESTIMATE AND ETHINYL ESTRADIOL 0.25-0.035
KIT ORAL
Qty: 84 TABLET | Refills: 3 | Status: SHIPPED | OUTPATIENT
Start: 2021-02-25 | End: 2022-01-27

## 2021-02-25 NOTE — TELEPHONE ENCOUNTER
Pateint had her annual exam on 2/4/2021 and needs this medication sent to pharmacy ASAP please.    Karla Chen, [place]

## 2021-03-30 ENCOUNTER — HOSPITAL ENCOUNTER (OUTPATIENT)
Dept: MAMMOGRAPHY | Facility: CLINIC | Age: 46
Discharge: HOME | End: 2021-03-30
Attending: NURSE PRACTITIONER | Admitting: NURSE PRACTITIONER
Payer: COMMERCIAL

## 2021-03-30 DIAGNOSIS — Z12.31 VISIT FOR SCREENING MAMMOGRAM: ICD-10-CM

## 2021-03-30 PROCEDURE — 77063 BREAST TOMOSYNTHESIS BI: CPT

## 2021-03-31 ENCOUNTER — IMMUNIZATION (OUTPATIENT)
Dept: NURSING | Facility: CLINIC | Age: 46
End: 2021-03-31
Payer: COMMERCIAL

## 2021-03-31 PROCEDURE — 91300 PR COVID VAC PFIZER DIL RECON 30 MCG/0.3 ML IM: CPT

## 2021-03-31 PROCEDURE — 0001A PR COVID VAC PFIZER DIL RECON 30 MCG/0.3 ML IM: CPT

## 2021-04-21 ENCOUNTER — IMMUNIZATION (OUTPATIENT)
Dept: NURSING | Facility: CLINIC | Age: 46
End: 2021-04-21
Attending: FAMILY MEDICINE
Payer: COMMERCIAL

## 2021-04-21 PROCEDURE — 0002A PR COVID VAC PFIZER DIL RECON 30 MCG/0.3 ML IM: CPT

## 2021-04-21 PROCEDURE — 91300 PR COVID VAC PFIZER DIL RECON 30 MCG/0.3 ML IM: CPT

## 2021-08-07 ENCOUNTER — HOSPITAL ENCOUNTER (EMERGENCY)
Facility: CLINIC | Age: 46
Discharge: HOME | End: 2021-08-07
Attending: PHYSICIAN ASSISTANT | Admitting: PHYSICIAN ASSISTANT
Payer: COMMERCIAL

## 2021-08-07 VITALS
SYSTOLIC BLOOD PRESSURE: 118 MMHG | WEIGHT: 145 LBS | HEART RATE: 89 BPM | RESPIRATION RATE: 16 BRPM | TEMPERATURE: 98 F | DIASTOLIC BLOOD PRESSURE: 83 MMHG | OXYGEN SATURATION: 97 % | BODY MASS INDEX: 26.52 KG/M2

## 2021-08-07 DIAGNOSIS — Z20.822 EXPOSURE TO COVID-19 VIRUS: ICD-10-CM

## 2021-08-07 DIAGNOSIS — Z20.822 ENCOUNTER FOR LABORATORY TESTING FOR COVID-19 VIRUS: ICD-10-CM

## 2021-08-07 LAB — SARS-COV-2 RNA RESP QL NAA+PROBE: NEGATIVE

## 2021-08-07 PROCEDURE — G0463 HOSPITAL OUTPT CLINIC VISIT: HCPCS | Performed by: PHYSICIAN ASSISTANT

## 2021-08-07 PROCEDURE — C9803 HOPD COVID-19 SPEC COLLECT: HCPCS | Performed by: PHYSICIAN ASSISTANT

## 2021-08-07 PROCEDURE — 99213 OFFICE O/P EST LOW 20 MIN: CPT | Performed by: PHYSICIAN ASSISTANT

## 2021-08-07 PROCEDURE — 87635 SARS-COV-2 COVID-19 AMP PRB: CPT | Performed by: PHYSICIAN ASSISTANT

## 2021-08-07 ASSESSMENT — ENCOUNTER SYMPTOMS
DIFFICULTY URINATING: 0
CONFUSION: 0
FEVER: 0
SHORTNESS OF BREATH: 0
HEADACHES: 0
NECK STIFFNESS: 0
EYE REDNESS: 0
COLOR CHANGE: 0
ARTHRALGIAS: 0
ABDOMINAL PAIN: 0

## 2021-08-07 NOTE — DISCHARGE INSTRUCTIONS
Increase fluids, rest, return if signs or symptoms of symptoms started.    Follow CDC guidelines with regards to any Covid exposures or symptoms.

## 2021-08-07 NOTE — ED PROVIDER NOTES
History     Chief Complaint   Patient presents with     Covid 19 Testing     pt wants to be tested for covid,  is sick. pt has no sx     HPI  Bua Carlos Marmolejo is a 46 year old female who presents today with  for covid testing. Patient has no symptoms, but  with URI symptoms for the past week. Both patient's had pfizer covid vaccines.    Allergies:  No Known Allergies    Problem List:    Patient Active Problem List    Diagnosis Date Noted     Previous  delivery, delivered 2007     Priority: Medium     Problem list name updated by automated process. Provider to review          Past Medical History:    History reviewed. No pertinent past medical history.    Past Surgical History:    Past Surgical History:   Procedure Laterality Date     C/SECTION, LOW TRANSVERSE  2007       Family History:    Family History   Problem Relation Age of Onset     Asthma Mother      Cancer Father         liver, ETOH abuse       Social History:  Marital Status:   [2]  Social History     Tobacco Use     Smoking status: Never Smoker     Smokeless tobacco: Never Used   Substance Use Topics     Alcohol use: Yes     Comment: rare     Drug use: No        Medications:    Calcium Carbonate (CALCIUM 600 PO)  Cholecalciferol (D3 VITAMIN PO)  MONO-LINYAH 0.25-35 MG-MCG tablet  Multiple Vitamins-Minerals (MULTIVITAMIN GUMMIES ADULT PO)          Review of Systems   Constitutional: Negative for fever.   HENT: Negative for congestion.    Eyes: Negative for redness.   Respiratory: Negative for shortness of breath.    Cardiovascular: Negative for chest pain.   Gastrointestinal: Negative for abdominal pain.   Genitourinary: Negative for difficulty urinating.   Musculoskeletal: Negative for arthralgias and neck stiffness.   Skin: Negative for color change.   Neurological: Negative for headaches.   Psychiatric/Behavioral: Negative for confusion.   All other systems reviewed and are negative.      Physical Exam   BP:  118/83  Pulse: 89  Temp: 98  F (36.7  C)  Resp: 16  Weight: 65.8 kg (145 lb)  SpO2: 97 %      Physical Exam  Vitals and nursing note reviewed.   Constitutional:       General: She is not in acute distress.     Appearance: Normal appearance. She is normal weight. She is not ill-appearing, toxic-appearing or diaphoretic.   HENT:      Head: Normocephalic and atraumatic.      Right Ear: Tympanic membrane and ear canal normal.      Left Ear: Tympanic membrane and ear canal normal.      Nose: Nose normal.      Mouth/Throat:      Mouth: Mucous membranes are moist.      Pharynx: Oropharynx is clear. No oropharyngeal exudate or posterior oropharyngeal erythema.   Eyes:      General: No scleral icterus.     Extraocular Movements: Extraocular movements intact.      Conjunctiva/sclera: Conjunctivae normal.      Pupils: Pupils are equal, round, and reactive to light.   Cardiovascular:      Rate and Rhythm: Normal rate and regular rhythm.      Pulses: Normal pulses.      Heart sounds: Normal heart sounds.   Pulmonary:      Effort: Pulmonary effort is normal. No respiratory distress.      Breath sounds: Normal breath sounds.   Musculoskeletal:      Cervical back: Normal range of motion and neck supple.   Lymphadenopathy:      Cervical: No cervical adenopathy.   Skin:     General: Skin is warm.      Capillary Refill: Capillary refill takes less than 2 seconds.      Findings: No bruising, erythema or rash.   Neurological:      General: No focal deficit present.      Mental Status: She is alert and oriented to person, place, and time.   Psychiatric:         Mood and Affect: Mood normal.         Behavior: Behavior normal.         Thought Content: Thought content normal.         Judgment: Judgment normal.         ED Course        Procedures             Critical Care time:  none               Results for orders placed or performed during the hospital encounter of 08/07/21 (from the past 24 hour(s))   Symptomatic COVID-19 Virus  (Coronavirus) by PCR *Canceled*    Specimen: Swab    Narrative    The following orders were created for panel order Symptomatic COVID-19 Virus (Coronavirus) by PCR.  Procedure                               Abnormality         Status                     ---------                               -----------         ------                       Please view results for these tests on the individual orders.   Asymptomatic COVID-19 Virus (Coronavirus) by PCR Nasopharyngeal    Specimen: Nasopharyngeal; Swab    Narrative    The following orders were created for panel order Asymptomatic COVID-19 Virus (Coronavirus) by PCR Nasopharyngeal.  Procedure                               Abnormality         Status                     ---------                               -----------         ------                     SARS-COV2 (COVID-19) Vir...[[id number]]  Normal              Final result                 Please view results for these tests on the individual orders.   SARS-COV2 (COVID-19) Virus RT-PCR    Specimen: Nasopharyngeal; Swab   Result Value Ref Range    SARS CoV2 PCR Negative Negative    Narrative    Testing was performed using the steff  SARS-CoV-2 & Influenza A/B Assay on the steff  Eri  System.  This test should be ordered for the detection of SARS-COV-2 in individuals who meet SARS-CoV-2 clinical and/or epidemiological criteria. Test performance is unknown in asymptomatic patients.  This test is for in vitro diagnostic use under the FDA EUA for laboratories certified under CLIA to perform moderate and/or high complexity testing. This test has not been FDA cleared or approved.  A negative test does not rule out the presence of PCR inhibitors in the specimen or target RNA in concentration below the limit of detection for the assay. The possibility of a false negative should be considered if the patient's recent exposure or clinical presentation suggests COVID-19.  [facility] are certified under the  Clinical Laboratory Improvement Amendments of 1988 (CLIA-88) as qualified to perform moderate and/or high complexity laboratory testing.       Medications - No data to display    Assessments & Plan (with Medical Decision Making)     I have reviewed the nursing notes.    I have reviewed the findings, diagnosis, plan and need for follow up with the patient.   46-year-old female presents the urgent care today with her  for Covid testing. Patient has no symptoms but states her  has had symptoms for the past week. They both have been vaccinated with the Pfizer vaccine series.  did test positive today for Covid. Patient's Covid result today was negative. Patient informed to quarantine at home per CDC guidelines with regards to covid exposure. Patient to return if symptoms develop. Patient discharged in stable condition.     New Prescriptions    No medications on file       Final diagnoses:   Encounter for laboratory testing for COVID-19 virus   Exposure to COVID-19 virus       8/7/2021   [facility] EMERGENCY DEPT     Idalia Martinez PA-C  08/07/21 [id number]

## 2021-09-25 ENCOUNTER — HEALTH MAINTENANCE LETTER (OUTPATIENT)
Age: 46
End: 2021-09-25

## 2022-01-26 DIAGNOSIS — Z30.41 ENCOUNTER FOR SURVEILLANCE OF CONTRACEPTIVE PILLS: ICD-10-CM

## 2022-01-27 RX ORDER — NORGESTIMATE AND ETHINYL ESTRADIOL 0.25-0.035
KIT ORAL
Qty: 84 TABLET | Refills: 0 | Status: SHIPPED | OUTPATIENT
Start: 2022-01-27 | End: 2022-04-20

## 2022-03-12 ENCOUNTER — HEALTH MAINTENANCE LETTER (OUTPATIENT)
Age: 47
End: 2022-03-12

## 2022-04-19 DIAGNOSIS — Z30.41 ENCOUNTER FOR SURVEILLANCE OF CONTRACEPTIVE PILLS: ICD-10-CM

## 2022-04-20 RX ORDER — NORGESTIMATE AND ETHINYL ESTRADIOL 0.25-0.035
KIT ORAL
Qty: 84 TABLET | Refills: 0 | Status: SHIPPED | OUTPATIENT
Start: 2022-04-20 | End: 2022-07-08

## 2022-04-20 NOTE — TELEPHONE ENCOUNTER
Advised patient on an appointment through [employer] message.     Routing refill request to provider for review/approval because:  Patient needs to be seen because it has been more than 1 year since last office visit.        Jordyn Watt RN BSN PHN

## 2022-07-02 ENCOUNTER — HEALTH MAINTENANCE LETTER (OUTPATIENT)
Age: 47
End: 2022-07-02

## 2022-07-13 ENCOUNTER — OFFICE VISIT (OUTPATIENT)
Dept: FAMILY MEDICINE | Facility: CLINIC | Age: 47
End: 2022-07-13
Payer: COMMERCIAL

## 2022-07-13 VITALS
WEIGHT: 151.8 LBS | OXYGEN SATURATION: 96 % | BODY MASS INDEX: 27.94 KG/M2 | SYSTOLIC BLOOD PRESSURE: 126 MMHG | HEART RATE: 84 BPM | RESPIRATION RATE: 16 BRPM | DIASTOLIC BLOOD PRESSURE: 86 MMHG | HEIGHT: 62 IN | TEMPERATURE: 98.6 F

## 2022-07-13 DIAGNOSIS — Z30.41 ENCOUNTER FOR SURVEILLANCE OF CONTRACEPTIVE PILLS: ICD-10-CM

## 2022-07-13 DIAGNOSIS — Z00.00 ANNUAL PHYSICAL EXAM: Primary | ICD-10-CM

## 2022-07-13 DIAGNOSIS — R21 RASH: ICD-10-CM

## 2022-07-13 DIAGNOSIS — Z12.31 VISIT FOR SCREENING MAMMOGRAM: ICD-10-CM

## 2022-07-13 DIAGNOSIS — Z12.4 CERVICAL CANCER SCREENING: ICD-10-CM

## 2022-07-13 LAB
ANION GAP SERPL CALCULATED.3IONS-SCNC: 5 MMOL/L (ref 3–14)
BUN SERPL-MCNC: 10 MG/DL (ref 7–30)
CALCIUM SERPL-MCNC: 8.6 MG/DL (ref 8.5–10.1)
CHLORIDE BLD-SCNC: 108 MMOL/L (ref 94–109)
CHOLEST SERPL-MCNC: 185 MG/DL
CO2 SERPL-SCNC: 26 MMOL/L (ref 20–32)
CREAT SERPL-MCNC: 0.59 MG/DL (ref 0.52–1.04)
FASTING STATUS PATIENT QL REPORTED: YES
GFR SERPL CREATININE-BSD FRML MDRD: >90 ML/MIN/1.73M2
GLUCOSE BLD-MCNC: 87 MG/DL (ref 70–99)
HDLC SERPL-MCNC: 75 MG/DL
LDLC SERPL CALC-MCNC: 74 MG/DL
NONHDLC SERPL-MCNC: 110 MG/DL
POTASSIUM BLD-SCNC: 3.9 MMOL/L (ref 3.4–5.3)
SODIUM SERPL-SCNC: 139 MMOL/L (ref 133–144)
TRIGL SERPL-MCNC: 179 MG/DL

## 2022-07-13 PROCEDURE — 80061 LIPID PANEL: CPT | Performed by: NURSE PRACTITIONER

## 2022-07-13 PROCEDURE — 99213 OFFICE O/P EST LOW 20 MIN: CPT | Mod: 25 | Performed by: NURSE PRACTITIONER

## 2022-07-13 PROCEDURE — 99396 PREV VISIT EST AGE 40-64: CPT | Performed by: NURSE PRACTITIONER

## 2022-07-13 PROCEDURE — G0145 SCR C/V CYTO,THINLAYER,RESCR: HCPCS | Performed by: NURSE PRACTITIONER

## 2022-07-13 PROCEDURE — 80048 BASIC METABOLIC PNL TOTAL CA: CPT | Performed by: NURSE PRACTITIONER

## 2022-07-13 PROCEDURE — 36415 COLL VENOUS BLD VENIPUNCTURE: CPT | Performed by: NURSE PRACTITIONER

## 2022-07-13 PROCEDURE — 87624 HPV HI-RISK TYP POOLED RSLT: CPT | Performed by: NURSE PRACTITIONER

## 2022-07-13 RX ORDER — TRIAMCINOLONE ACETONIDE 1 MG/G
CREAM TOPICAL 2 TIMES DAILY
Qty: 80 G | Refills: 3 | Status: SHIPPED | OUTPATIENT
Start: 2022-07-13

## 2022-07-13 RX ORDER — NORGESTIMATE AND ETHINYL ESTRADIOL 0.25-0.035
1 KIT ORAL DAILY
Qty: 84 TABLET | Refills: 3 | Status: SHIPPED | OUTPATIENT
Start: 2022-07-13 | End: 2023-08-03

## 2022-07-13 ASSESSMENT — ENCOUNTER SYMPTOMS
PARESTHESIAS: 0
FEVER: 0
DYSURIA: 0
COUGH: 0
HEADACHES: 0
SORE THROAT: 0
EYE PAIN: 0
HEARTBURN: 0
SHORTNESS OF BREATH: 0
NAUSEA: 0
HEMATOCHEZIA: 0
DIARRHEA: 0
HEMATURIA: 0
JOINT SWELLING: 0
DIZZINESS: 0
CHILLS: 0
CONSTIPATION: 0
FREQUENCY: 0
NERVOUS/ANXIOUS: 0
ARTHRALGIAS: 0
ABDOMINAL PAIN: 0
MYALGIAS: 0
PALPITATIONS: 0
WEAKNESS: 0
BREAST MASS: 0

## 2022-07-13 ASSESSMENT — PAIN SCALES - GENERAL: PAINLEVEL: NO PAIN (0)

## 2022-07-13 NOTE — PROGRESS NOTES
SUBJECTIVE:   CC: Michael Marmolejo is an 47 year old woman who presents for preventive health visit.     Patient has been advised of split billing requirements and indicates understanding: Yes  Healthy Habits:     Getting at least 3 servings of Calcium per day:  Yes    Bi-annual eye exam:  Yes    Dental care twice a year:  NO    Sleep apnea or symptoms of sleep apnea:  None    Diet:  Regular (no restrictions)    Frequency of exercise:  2-3 days/week    Duration of exercise:  30-45 minutes    Taking medications regularly:  Yes    Medication side effects:  None    PHQ-2 Total Score: 0    Additional concerns today:  Yes (Has a rash on her face that started yesterday. Used Triamcinolone cream that was helpful and would like a refill. )      Today's PHQ-2 Score:   PHQ-2 ( 1999 Pfizer) 7/13/2022   Q1: Little interest or pleasure in doing things 0   Q2: Feeling down, depressed or hopeless 0   PHQ-2 Score 0   PHQ-2 Total Score (12-17 Years)- Positive if 3 or more points; Administer PHQ-A if positive -   Q1: Little interest or pleasure in doing things Not at all   Q2: Feeling down, depressed or hopeless Not at all   PHQ-2 Score 0       Abuse: Current or Past (Physical, Sexual or Emotional) - No  Do you feel safe in your environment? Yes    Have you ever done Advance Care Planning? (For example, a Health Directive, POLST, or a discussion with a medical provider or your loved ones about your wishes): No, advance care planning information given to patient to review.  Patient declined advance care planning discussion at this time.    Social History     Tobacco Use     Smoking status: Never Smoker     Smokeless tobacco: Never Used   Substance Use Topics     Alcohol use: Yes     Comment: rare     If you drink alcohol do you typically have >3 drinks per day or >7 drinks per week? No    Alcohol Use 7/13/2022   Prescreen: >3 drinks/day or >7 drinks/week? No   Prescreen: >3 drinks/day or >7 drinks/week? -     Reviewed orders with  patient.  Reviewed health maintenance and updated orders accordingly - Yes    Breast Cancer Screening:    Breast CA Risk Assessment (FHS-7) 7/13/2022   Do you have a family history of breast, colon, or ovarian cancer? No / Unknown         Mammogram Screening: Recommended annual mammography  Pertinent mammograms are reviewed under the imaging tab.    History of abnormal Pap smear: NO - age 30- 65 PAP every 3 years recommended  PAP / HPV Latest Ref Rng & Units 1/2/2020 7/22/2015 2/7/2008   PAP (Historical) - NIL NIL NIL   HPV16 NEG:Negative Negative - -   HPV18 NEG:Negative Negative - -   HRHPV NEG:Negative Negative - -     Reviewed and updated as needed this visit by clinical staff   Tobacco  Allergies  Meds  Problems  Med Hx  Surg Hx  Fam Hx  Soc   Hx          Reviewed and updated as needed this visit by Provider      Problems                  Review of Systems   Constitutional: Negative for chills and fever.   HENT: Negative for congestion, ear pain, hearing loss and sore throat.    Eyes: Negative for pain and visual disturbance.   Respiratory: Negative for cough and shortness of breath.    Cardiovascular: Negative for chest pain, palpitations and peripheral edema.   Gastrointestinal: Negative for abdominal pain, constipation, diarrhea, heartburn, hematochezia and nausea.   Breasts:  Negative for tenderness, breast mass and discharge.   Genitourinary: Negative for dysuria, frequency, genital sores, hematuria, pelvic pain, urgency, vaginal bleeding and vaginal discharge.   Musculoskeletal: Negative for arthralgias, joint swelling and myalgias.   Skin: Negative for rash.   Neurological: Negative for dizziness, weakness, headaches and paresthesias.   Psychiatric/Behavioral: Negative for mood changes. The patient is not nervous/anxious.           OBJECTIVE:   /86 (BP Location: Left arm, Patient Position: Sitting, Cuff Size: Adult Regular)   Pulse 84   Temp 98.6  F (37  C) (Tympanic)   Resp 16   Ht  "1.568 m (5' 1.75\")   Wt 68.9 kg (151 lb 12.8 oz)   LMP 06/20/2022   SpO2 96%   Breastfeeding Yes   BMI 27.99 kg/m    Physical Exam  GENERAL: healthy, alert and no distress  EYES: Eyes grossly normal to inspection, PERRL and conjunctivae and sclerae normal  HENT: ear canals and TM's normal, nose and mouth without ulcers or lesions  NECK: no adenopathy, no asymmetry, masses, or scars and thyroid normal to palpation  RESP: lungs clear to auscultation - no rales, rhonchi or wheezes  CV: regular rate and rhythm, normal S1 S2, no S3 or S4, no murmur, click or rub, no peripheral edema and peripheral pulses strong   (female): normal female external genitalia, normal urethral meatus, vaginal mucosa, normal cervix/adnexa/uterus without masses or discharge  MS: no gross musculoskeletal defects noted, no edema  SKIN: slight hives on the face and upper back   NEURO: Normal strength and tone, mentation intact and speech normal  PSYCH: mentation appears normal, affect normal/bright    Diagnostic Test Results:  Labs reviewed in Epic    ASSESSMENT/PLAN:   (Z00.00) Annual physical exam  (primary encounter diagnosis)  Comment:   Plan: Pap screen with HPV - recommended age 30 - 65         years, Lipid panel reflex to direct LDL         Fasting, Basic metabolic panel  (Ca, Cl, CO2,         Creat, Gluc, K, Na, BUN)      (Z12.31) Visit for screening mammogram  Plan: MA SCREENING DIGITAL BILAT - Future  (s+30)            (Z12.4) Cervical cancer screening  Comment: pap test pending       (Z30.41) Encounter for surveillance of contraceptive pills  Comment: no side effects   Plan: norgestimate-ethinyl estradiol (ORTHO-CYCLEN)         0.25-35 MG-MCG tablet            (R21) Rash  Comment: possible allergic reaction   Plan: triamcinolone (KENALOG) 0.1 % external cream twice daily as needed for up to 2 weeks             COUNSELING:  Reviewed preventive health counseling, as reflected in patient instructions       Regular exercise       " "Healthy diet/nutrition    Estimated body mass index is 27.99 kg/m  as calculated from the following:    Height as of this encounter: 1.568 m (5' 1.75\").    Weight as of this encounter: 68.9 kg (151 lb 12.8 oz).        She reports that she has never smoked. She has never used smokeless tobacco.      Counseling Resources:  ATP IV Guidelines  Pooled Cohorts Equation Calculator  Breast Cancer Risk Calculator  BRCA-Related Cancer Risk Assessment: FHS-7 Tool  FRAX Risk Assessment  ICSI Preventive Guidelines  Dietary Guidelines for Americans, 2010  USDA's MyPlate  ASA Prophylaxis  Lung CA Screening    HOA Albrecht CNP  [facility]  "

## 2022-07-15 LAB
BKR LAB AP GYN ADEQUACY: NORMAL
BKR LAB AP GYN INTERPRETATION: NORMAL
BKR LAB AP HPV REFLEX: NORMAL
BKR LAB AP PREVIOUS ABNORMAL: NORMAL
PATH REPORT.COMMENTS IMP SPEC: NORMAL
PATH REPORT.COMMENTS IMP SPEC: NORMAL
PATH REPORT.RELEVANT HX SPEC: NORMAL

## 2022-07-19 ENCOUNTER — HOSPITAL ENCOUNTER (OUTPATIENT)
Dept: MAMMOGRAPHY | Facility: CLINIC | Age: 47
Discharge: HOME | End: 2022-07-19
Attending: NURSE PRACTITIONER | Admitting: NURSE PRACTITIONER
Payer: COMMERCIAL

## 2022-07-19 DIAGNOSIS — Z12.31 VISIT FOR SCREENING MAMMOGRAM: ICD-10-CM

## 2022-07-19 LAB
HUMAN PAPILLOMA VIRUS 16 DNA: NEGATIVE
HUMAN PAPILLOMA VIRUS 18 DNA: NEGATIVE
HUMAN PAPILLOMA VIRUS FINAL DIAGNOSIS: NORMAL
HUMAN PAPILLOMA VIRUS OTHER HR: NEGATIVE

## 2022-07-19 PROCEDURE — 77067 SCR MAMMO BI INCL CAD: CPT

## 2022-11-14 ENCOUNTER — TELEPHONE (OUTPATIENT)
Dept: FAMILY MEDICINE | Facility: CLINIC | Age: 47
End: 2022-11-14

## 2022-11-14 NOTE — LETTER
November 14, 2022      Michael Marmolejo  [id number] [id number]    Your team at [facility] cares about your health. We have reviewed your chart and based on our findings; we are making the following recommendations to better manage your health.     You are in particular need of attention regarding the following:     Call or MyChart message your clinic to schedule a colonoscopy, schedule/ a FIT Test, or order a Cologuard test. If you are unsure what type of test you need, please call your clinic and speak to clinic staff.   Colon cancer is now the second leading cause of cancer-related deaths in the [facility] for both men and women and there are over 130,000 new cases and 50,000 deaths per year from colon cancer. Colonoscopies can prevent 90-95% of these deaths. Problem lesions can be removed before they ever become cancer. This test is not only looking for cancer, but also getting rid of precancerous lesions.   If you are under/uninsured, we recommend you contact the [employer] is a free colorectal cancer screening program that provides colonoscopies for eligible under/uninsured [place] men and women. If you are interested in receiving a free colonoscopy, please call [employer] at t [phone] (mention code [employer]) to see if you're eligible. Please have them send us the results.     If you have already completed these items, please contact the clinic via phone or   [employer] so your care team can review and update your records. Thank you for   choosing [facility] for your healthcare needs. For any questions,   concerns, or to schedule an appointment please contact our clinic at [phone].    Healthy Regards,      Your [facility] Care Team

## 2022-11-14 NOTE — TELEPHONE ENCOUNTER
Patient Quality Outreach    Patient is due for the following:   Colon Cancer Screening    Next Steps:   No follow up needed at this time.    Type of outreach:    Sent letter.    Next Steps:  Reach out within 90 days via [email or web address].    Max number of attempts reached: No. Will try again in 90 days if patient still on fail list.    Questions for provider review:    None     CHLOÉ Connor LPN

## 2023-01-07 ENCOUNTER — HEALTH MAINTENANCE LETTER (OUTPATIENT)
Age: 48
End: 2023-01-07

## 2023-07-24 ENCOUNTER — TELEPHONE (OUTPATIENT)
Dept: FAMILY MEDICINE | Facility: CLINIC | Age: 48
End: 2023-07-24
Payer: COMMERCIAL

## 2023-07-24 NOTE — TELEPHONE ENCOUNTER
Pt states developed rash between upper thighs on Friday. States looks like a bruise now. Now rash spread to arm and starting on neck. Per pt they are small bumps and not hives. Pt has tried cortisone cream. States is very itchy and gets very hot in the middle of the night. Scheduled next day appt with instructions to go to ed with any worsening, hives or oral involvement.       Barry Hook RN

## 2023-07-25 ENCOUNTER — OFFICE VISIT (OUTPATIENT)
Dept: FAMILY MEDICINE | Facility: CLINIC | Age: 48
End: 2023-07-25
Payer: COMMERCIAL

## 2023-07-25 VITALS
BODY MASS INDEX: 28.05 KG/M2 | HEART RATE: 71 BPM | RESPIRATION RATE: 16 BRPM | OXYGEN SATURATION: 98 % | TEMPERATURE: 98.1 F | DIASTOLIC BLOOD PRESSURE: 80 MMHG | WEIGHT: 152.4 LBS | SYSTOLIC BLOOD PRESSURE: 118 MMHG | HEIGHT: 62 IN

## 2023-07-25 DIAGNOSIS — L30.9 DERMATITIS: Primary | ICD-10-CM

## 2023-07-25 PROCEDURE — 99213 OFFICE O/P EST LOW 20 MIN: CPT | Performed by: INTERNAL MEDICINE

## 2023-07-25 RX ORDER — METHYLPREDNISOLONE 4 MG
TABLET, DOSE PACK ORAL
Qty: 21 TABLET | Refills: 0 | Status: SHIPPED | OUTPATIENT
Start: 2023-07-25 | End: 2023-08-03

## 2023-07-25 ASSESSMENT — PAIN SCALES - GENERAL: PAINLEVEL: NO PAIN (0)

## 2023-07-25 NOTE — PATIENT INSTRUCTIONS
Health Care Maintenance  You are due for annual physical with your primary care provider    Rash  Start steroid pack  Take Loratidine (Claritin) 10 mg in AM and Certirizine (Zyretc) 10  mg at bedtime  Ok to use Diphenhydramine (Benadryl) 25-50 mg up to 4 x day as needed.  Watch for drowsiness  Ok to use Hydrocortisone cream as needed to itchy areas  Try to avoid scratching, heat, sun  Ice or oatmeal bath can calm down itching  Follow-up if rash is persisting

## 2023-07-25 NOTE — PROGRESS NOTES
Assessment & Plan     Dermatitis - unclear culprit - rash does not look like bedbugs, poison ivy or other contact dermatitis.  Distribution is unusual - inner thighs near groin, and widespread arms/legs, anterior torso.  Treatment as below, follow-up if not improving, consider timely referral to Derm  - methylPREDNISolone (MEDROL DOSEPAK) 4 MG tablet therapy pack; Follow Package Directions      Patient Instructions   Health Care Maintenance  You are due for annual physical with your primary care provider    Rash  Start steroid pack  Take Loratidine (Claritin) 10 mg in AM and Certirizine (Zyretc) 10  mg at bedtime  Ok to use Diphenhydramine (Benadryl) 25-50 mg up to 4 x day as needed.  Watch for drowsiness  Ok to use Hydrocortisone cream as needed to itchy areas  Try to avoid scratching, heat, sun  Ice or oatmeal bath can calm down itching  Follow-up if rash is persisting    Esme Mccullough, [facility]AR[facility] Farley is a 48 year old, presenting for the following health issues:  Derm Problem        7/25/2023     7:02 AM   Additional Questions   Roomed by dewayne carreno   Accompanied by self         7/25/2023     7:02 AM   Patient Reported Additional Medications   Patient reports taking the following new medications none     History of Present Illness       Reason for visit:  Rash  Symptom onset:  3-7 days ago    She eats 0-1 servings of fruits and vegetables daily.She consumes 0 sweetened beverage(s) daily.She exercises with enough effort to increase her heart rate 9 or less minutes per day.  She exercises with enough effort to increase her heart rate 3 or less days per week.   She is taking medications regularly.       Chief Complaint   Patient presents with    Derm Problem         Rash  Onset/Duration: July 21 2023  Description  Location: neck, arms, legs, abdomen   Character: blotchy, red  Itching: severe  Intensity:  severe  Progression of Symptoms:  worsening  Accompanying signs  "and symptoms:   Fever: No  Body aches or joint pain: No  Sore throat symptoms: No  Recent cold symptoms: No  History:           Previous episodes of similar rash: yes feb 2023 and June 2023 had covid then  New exposures:  None  Recent travel: No  Exposure to similar rash: No  Precipitating or alleviating factors:   Therapies tried and outcome: hydrocortisone cream -  effective and triamcinolone helpful; benadryl is helping  --rash is very itchy  --no change to personal care products - soaps, laundry, etc  --no exposure to poison ivy that she is aware of;  did  her large dog  --no one else in her home has a rash  --rash is bilateral arms, upper chest wall, bilateral arms; most concentrated is bilateral inner thighs near groin and extending down to medial knee area        Current Outpatient Medications   Medication Sig Dispense Refill    Calcium Carbonate (CALCIUM 600 PO)       Cholecalciferol (D3 VITAMIN PO)       Multiple Vitamins-Minerals (MULTIVITAMIN GUMMIES ADULT PO)       norgestimate-ethinyl estradiol (ORTHO-CYCLEN) 0.25-35 MG-MCG tablet Take 1 tablet by mouth daily 84 tablet 3    triamcinolone (KENALOG) 0.1 % external cream Apply topically 2 times daily 80 g 3           Review of Systems   Constitutional, HEENT, cardiovascular, pulmonary, gi and gu systems are negative, except as otherwise noted.      Objective    /80 (BP Location: Right arm, Patient Position: Sitting, Cuff Size: Adult Regular)   Pulse 71   Temp 98.1  F (36.7  C) (Tympanic)   Resp 16   Ht 1.57 m (5' 1.81\")   Wt 69.1 kg (152 lb 6.4 oz)   LMP 07/21/2023 (Approximate)   SpO2 98%   BMI 28.05 kg/m    Body mass index is 28.05 kg/m .  Physical Exam   GENERAL APPEARANCE: healthy, alert, and no distress  SKIN: excoriations without underlying rash present widespread bilateral arms/legs, anterior torso and bilateral inner thighs near groin                      "

## 2023-08-03 ENCOUNTER — OFFICE VISIT (OUTPATIENT)
Dept: FAMILY MEDICINE | Facility: CLINIC | Age: 48
End: 2023-08-03
Payer: COMMERCIAL

## 2023-08-03 VITALS
RESPIRATION RATE: 16 BRPM | BODY MASS INDEX: 28.05 KG/M2 | HEIGHT: 62 IN | OXYGEN SATURATION: 98 % | SYSTOLIC BLOOD PRESSURE: 114 MMHG | WEIGHT: 152.4 LBS | DIASTOLIC BLOOD PRESSURE: 82 MMHG | HEART RATE: 81 BPM | TEMPERATURE: 98.2 F

## 2023-08-03 DIAGNOSIS — I49.8 BIGEMINY: ICD-10-CM

## 2023-08-03 DIAGNOSIS — Z30.41 ENCOUNTER FOR SURVEILLANCE OF CONTRACEPTIVE PILLS: ICD-10-CM

## 2023-08-03 DIAGNOSIS — Z00.00 ANNUAL PHYSICAL EXAM: Primary | ICD-10-CM

## 2023-08-03 DIAGNOSIS — Z12.31 VISIT FOR SCREENING MAMMOGRAM: ICD-10-CM

## 2023-08-03 DIAGNOSIS — Z11.59 NEED FOR HEPATITIS C SCREENING TEST: ICD-10-CM

## 2023-08-03 DIAGNOSIS — Z12.11 SCREEN FOR COLON CANCER: ICD-10-CM

## 2023-08-03 LAB
ANION GAP SERPL CALCULATED.3IONS-SCNC: 11 MMOL/L (ref 7–15)
BUN SERPL-MCNC: 10.8 MG/DL (ref 6–20)
CALCIUM SERPL-MCNC: 9.2 MG/DL (ref 8.6–10)
CHLORIDE SERPL-SCNC: 102 MMOL/L (ref 98–107)
CHOLEST SERPL-MCNC: 170 MG/DL
CREAT SERPL-MCNC: 0.74 MG/DL (ref 0.51–0.95)
DEPRECATED HCO3 PLAS-SCNC: 25 MMOL/L (ref 22–29)
ERYTHROCYTE [DISTWIDTH] IN BLOOD BY AUTOMATED COUNT: 14.8 % (ref 10–15)
GFR SERPL CREATININE-BSD FRML MDRD: >90 ML/MIN/1.73M2
GLUCOSE SERPL-MCNC: 82 MG/DL (ref 70–99)
HCT VFR BLD AUTO: 39.1 % (ref 35–47)
HCV AB SERPL QL IA: NONREACTIVE
HDLC SERPL-MCNC: 65 MG/DL
HGB BLD-MCNC: 12.7 G/DL (ref 11.7–15.7)
LDLC SERPL CALC-MCNC: 64 MG/DL
MAGNESIUM SERPL-MCNC: 2 MG/DL (ref 1.7–2.3)
MCH RBC QN AUTO: 23.9 PG (ref 26.5–33)
MCHC RBC AUTO-ENTMCNC: 32.5 G/DL (ref 31.5–36.5)
MCV RBC AUTO: 74 FL (ref 78–100)
NONHDLC SERPL-MCNC: 105 MG/DL
PLATELET # BLD AUTO: 218 10E3/UL (ref 150–450)
POTASSIUM SERPL-SCNC: 4.1 MMOL/L (ref 3.4–5.3)
RBC # BLD AUTO: 5.31 10E6/UL (ref 3.8–5.2)
SODIUM SERPL-SCNC: 138 MMOL/L (ref 136–145)
TRIGL SERPL-MCNC: 205 MG/DL
TSH SERPL DL<=0.005 MIU/L-ACNC: 2.27 UIU/ML (ref 0.3–4.2)
WBC # BLD AUTO: 12.8 10E3/UL (ref 4–11)

## 2023-08-03 PROCEDURE — 80061 LIPID PANEL: CPT | Performed by: NURSE PRACTITIONER

## 2023-08-03 PROCEDURE — 83735 ASSAY OF MAGNESIUM: CPT | Performed by: NURSE PRACTITIONER

## 2023-08-03 PROCEDURE — 93000 ELECTROCARDIOGRAM COMPLETE: CPT | Performed by: NURSE PRACTITIONER

## 2023-08-03 PROCEDURE — 80048 BASIC METABOLIC PNL TOTAL CA: CPT | Performed by: NURSE PRACTITIONER

## 2023-08-03 PROCEDURE — 99214 OFFICE O/P EST MOD 30 MIN: CPT | Mod: 25 | Performed by: NURSE PRACTITIONER

## 2023-08-03 PROCEDURE — 99396 PREV VISIT EST AGE 40-64: CPT | Performed by: NURSE PRACTITIONER

## 2023-08-03 PROCEDURE — 85027 COMPLETE CBC AUTOMATED: CPT | Performed by: NURSE PRACTITIONER

## 2023-08-03 PROCEDURE — 36415 COLL VENOUS BLD VENIPUNCTURE: CPT | Performed by: NURSE PRACTITIONER

## 2023-08-03 PROCEDURE — 84443 ASSAY THYROID STIM HORMONE: CPT | Performed by: NURSE PRACTITIONER

## 2023-08-03 PROCEDURE — 86803 HEPATITIS C AB TEST: CPT | Performed by: NURSE PRACTITIONER

## 2023-08-03 RX ORDER — NORGESTIMATE AND ETHINYL ESTRADIOL 0.25-0.035
1 KIT ORAL DAILY
Qty: 84 TABLET | Refills: 3 | Status: SHIPPED | OUTPATIENT
Start: 2023-08-03 | End: 2024-06-18

## 2023-08-03 ASSESSMENT — ENCOUNTER SYMPTOMS
HEMATOCHEZIA: 0
EYE PAIN: 0
HEMATURIA: 0
DYSURIA: 0
DIARRHEA: 0
HEADACHES: 0
SHORTNESS OF BREATH: 0
BREAST MASS: 0
COUGH: 0
NERVOUS/ANXIOUS: 0
JOINT SWELLING: 0
ABDOMINAL PAIN: 0
WEAKNESS: 0
ARTHRALGIAS: 0
FEVER: 0
HEARTBURN: 0
SORE THROAT: 0
PARESTHESIAS: 0
FREQUENCY: 0
DIZZINESS: 0
MYALGIAS: 0
CONSTIPATION: 0
NAUSEA: 0
PALPITATIONS: 0
CHILLS: 0

## 2023-08-03 ASSESSMENT — PAIN SCALES - GENERAL: PAINLEVEL: NO PAIN (0)

## 2023-08-03 NOTE — PROGRESS NOTES
SUBJECTIVE:   CC: Martine is an 48 year old who presents for preventive health visit.       8/3/2023     7:57 AM   Additional Questions   Roomed by Beulah       Healthy Habits:     Getting at least 3 servings of Calcium per day:  Yes    Bi-annual eye exam:  Yes    Dental care twice a year:  NO    Sleep apnea or symptoms of sleep apnea:  None    Diet:  Regular (no restrictions)    Frequency of exercise:  2-3 days/week    Duration of exercise:  15-30 minutes    Taking medications regularly:  Yes    Medication side effects:  None    Additional concerns today:  No      Social History     Tobacco Use    Smoking status: Never    Smokeless tobacco: Never   Substance Use Topics    Alcohol use: Yes     Comment: rare           8/3/2023     7:58 AM   Alcohol Use   Prescreen: >3 drinks/day or >7 drinks/week? No     Reviewed orders with patient.  Reviewed health maintenance and updated orders accordingly - Yes      Breast Cancer Screenin/13/2022    10:33 AM   Breast CA Risk Assessment (FHS-7)   Do you have a family history of breast, colon, or ovarian cancer? No / Unknown       click delete button to remove this line now  Mammogram Screening: Recommended annual mammography  Pertinent mammograms are reviewed under the imaging tab.    History of abnormal Pap smear: NO - age 21-29 PAP every 3 years recommended      Latest Ref Rng & Units 2022    10:54 AM 2020     9:06 AM 2020     9:00 AM   PAP / HPV   PAP  Negative for Intraepithelial Lesion or Malignancy (NILM)      PAP (Historical)   NIL     HPV 16 DNA Negative Negative   Negative    HPV 18 DNA Negative Negative   Negative    Other HR HPV Negative Negative   Negative      Reviewed and updated as needed this visit by clinical staff    Allergies  Meds              Reviewed and updated as needed this visit by Provider                     Review of Systems   Constitutional:  Negative for chills and fever.   HENT:  Negative for congestion, ear pain, hearing  "loss and sore throat.    Eyes:  Negative for pain and visual disturbance.   Respiratory:  Negative for cough and shortness of breath.    Cardiovascular:  Negative for chest pain, palpitations and peripheral edema.   Gastrointestinal:  Negative for abdominal pain, constipation, diarrhea, heartburn, hematochezia and nausea.   Breasts:  Negative for tenderness, breast mass and discharge.   Genitourinary:  Negative for dysuria, frequency, genital sores, hematuria, pelvic pain, urgency, vaginal bleeding and vaginal discharge.   Musculoskeletal:  Negative for arthralgias, joint swelling and myalgias.   Skin:  Negative for rash.   Neurological:  Negative for dizziness, weakness, headaches and paresthesias.   Psychiatric/Behavioral:  Negative for mood changes. The patient is not nervous/anxious.           OBJECTIVE:   /82 (BP Location: Left arm, Patient Position: Sitting, Cuff Size: Adult Regular)   Pulse 81   Temp 98.2  F (36.8  C) (Tympanic)   Resp 16   Ht 1.568 m (5' 1.75\")   Wt 69.1 kg (152 lb 6.4 oz)   LMP 07/21/2023 (Approximate)   SpO2 98%   BMI 28.10 kg/m    Physical Exam  GENERAL: healthy, alert and no distress  EYES: Eyes grossly normal to inspection, PERRL and conjunctivae and sclerae normal  HENT: ear canals and TM's normal, nose and mouth without ulcers or lesions  NECK: no adenopathy, no asymmetry, masses, or scars and thyroid normal to palpation  RESP: lungs clear to auscultation - no rales, rhonchi or wheezes  CV: frequent extasystoles without compensatory pause, normal S1 S2, no S3 or S4, no murmur, click or rub, peripheral pulses strong, and no peripheral edema  MS: no gross musculoskeletal defects noted, no edema  SKIN: no suspicious lesions or rashes  NEURO: Normal strength and tone, mentation intact and speech normal  PSYCH: mentation appears normal, affect normal/bright    Diagnostic Test Results:  Labs reviewed in Epic  Results for orders placed or performed in visit on 08/03/23 (from " the past 24 hour(s))   Lipid panel reflex to direct LDL Fasting   Result Value Ref Range    Cholesterol 170 <200 mg/dL    Triglycerides 205 (H) <150 mg/dL    Direct Measure HDL 65 >=50 mg/dL    LDL Cholesterol Calculated 64 <=100 mg/dL    Non HDL Cholesterol 105 <130 mg/dL    Narrative    Cholesterol  Desirable:  <200 mg/dL    Triglycerides  Normal:  Less than 150 mg/dL  Borderline High:  150-199 mg/dL  High:  200-499 mg/dL  Very High:  Greater than or equal to 500 mg/dL    Direct Measure HDL  Female:  Greater than or equal to 50 mg/dL   Male:  Greater than or equal to 40 mg/dL    LDL Cholesterol  Desirable:  <100mg/dL  Above Desirable:  100-129 mg/dL   Borderline High:  130-159 mg/dL   High:  160-189 mg/dL   Very High:  >= 190 mg/dL    Non HDL Cholesterol  Desirable:  130 mg/dL  Above Desirable:  130-159 mg/dL  Borderline High:  160-189 mg/dL  High:  190-219 mg/dL  Very High:  Greater than or equal to 220 mg/dL   Basic metabolic panel  (Ca, Cl, CO2, Creat, Gluc, K, Na, BUN)   Result Value Ref Range    Sodium 138 136 - 145 mmol/L    Potassium 4.1 3.4 - 5.3 mmol/L    Chloride 102 98 - 107 mmol/L    Carbon Dioxide (CO2) 25 22 - 29 mmol/L    Anion Gap 11 7 - 15 mmol/L    Urea Nitrogen 10.8 6.0 - 20.0 mg/dL    Creatinine 0.74 0.51 - 0.95 mg/dL    Calcium 9.2 8.6 - 10.0 mg/dL    Glucose 82 70 - 99 mg/dL    GFR Estimate >90 >60 mL/min/1.73m2   TSH with free T4 reflex   Result Value Ref Range    TSH 2.27 0.30 - 4.20 uIU/mL   Magnesium   Result Value Ref Range    Magnesium 2.0 1.7 - 2.3 mg/dL   CBC with platelets   Result Value Ref Range    WBC Count 12.8 (H) 4.0 - 11.0 10e3/uL    RBC Count 5.31 (H) 3.80 - 5.20 10e6/uL    Hemoglobin 12.7 11.7 - 15.7 g/dL    Hematocrit 39.1 35.0 - 47.0 %    MCV 74 (L) 78 - 100 fL    MCH 23.9 (L) 26.5 - 33.0 pg    MCHC 32.5 31.5 - 36.5 g/dL    RDW 14.8 10.0 - 15.0 %    Platelet Count 218 150 - 450 10e3/uL       ASSESSMENT/PLAN:   (Z00.00) Annual physical exam  (primary encounter  "diagnosis)  Plan: Lipid panel reflex to direct LDL Fasting,          (Z12.11) Screen for colon cancer  Plan: Fecal colorectal cancer screen (FIT), Fecal         colorectal cancer screen FIT       (Z11.59) Need for hepatitis C screening test  Plan: Hepatitis C Screen Reflex to HCV RNA Quant and         Genotype      (Z12.31) Visit for screening mammogram  Plan: MA SCREENING DIGITAL BILAT - Future  (s+30)      (Z30.41) Encounter for surveillance of contraceptive pills  Comment: no side effects, continue as prescribed   Plan: norgestimate-ethinyl estradiol (ORTHO-CYCLEN)         0.25-35 MG-MCG tablet            (I49.8) Calvin  Comment: patient is asymptomatic, states she is feeling well, but did not sleep well last night, worries about her daughter who is planning to move out of state to attend college. Labs normal, EKG also showed possible atrial enlargement, recommended heart ECHO for additional evaluation   Plan: EKG 12-lead complete w/read - Clinics, Basic         metabolic panel  (Ca, Cl, CO2, Creat, Gluc, K,         Na, BUN), TSH with free T4 reflex, Magnesium,         CBC with platelets, Echocardiogram Complete            COUNSELING:  Reviewed preventive health counseling, as reflected in patient instructions       Regular exercise       Healthy diet/nutrition      BMI:   Estimated body mass index is 28.1 kg/m  as calculated from the following:    Height as of this encounter: 1.568 m (5' 1.75\").    Weight as of this encounter: 69.1 kg (152 lb 6.4 oz).         She reports that she has never smoked. She has never used smokeless tobacco.          Kim Fraser, HOA CNP  [facility]  "

## 2023-08-04 ENCOUNTER — ANCILLARY PROCEDURE (OUTPATIENT)
Dept: CARDIOLOGY | Facility: CLINIC | Age: 48
End: 2023-08-04
Attending: NURSE PRACTITIONER
Payer: COMMERCIAL

## 2023-08-04 DIAGNOSIS — I49.8 BIGEMINY: Primary | ICD-10-CM

## 2023-08-04 DIAGNOSIS — I49.8 BIGEMINY: ICD-10-CM

## 2023-08-04 DIAGNOSIS — R93.1 ABNORMAL ECHOCARDIOGRAM: ICD-10-CM

## 2023-08-04 LAB — LVEF ECHO: NORMAL

## 2023-08-04 PROCEDURE — 93306 TTE W/DOPPLER COMPLETE: CPT | Mod: GC | Performed by: INTERNAL MEDICINE

## 2023-08-05 ENCOUNTER — MYC MEDICAL ADVICE (OUTPATIENT)
Dept: FAMILY MEDICINE | Facility: CLINIC | Age: 48
End: 2023-08-05
Payer: COMMERCIAL

## 2023-08-11 ENCOUNTER — LAB (OUTPATIENT)
Dept: LAB | Facility: CLINIC | Age: 48
End: 2023-08-11
Payer: COMMERCIAL

## 2023-08-11 DIAGNOSIS — Z12.11 SCREEN FOR COLON CANCER: ICD-10-CM

## 2023-08-11 LAB — HEMOCCULT STL QL IA: NEGATIVE

## 2023-08-11 PROCEDURE — 82274 ASSAY TEST FOR BLOOD FECAL: CPT

## 2023-08-17 ENCOUNTER — OFFICE VISIT (OUTPATIENT)
Dept: CARDIOLOGY | Facility: CLINIC | Age: 48
End: 2023-08-17
Payer: COMMERCIAL

## 2023-08-17 ENCOUNTER — HOSPITAL ENCOUNTER (OUTPATIENT)
Dept: CARDIOLOGY | Facility: CLINIC | Age: 48
Discharge: HOME | End: 2023-08-17
Attending: INTERNAL MEDICINE | Admitting: INTERNAL MEDICINE
Payer: COMMERCIAL

## 2023-08-17 VITALS
OXYGEN SATURATION: 97 % | DIASTOLIC BLOOD PRESSURE: 80 MMHG | WEIGHT: 152 LBS | HEART RATE: 65 BPM | SYSTOLIC BLOOD PRESSURE: 119 MMHG | BODY MASS INDEX: 28.03 KG/M2

## 2023-08-17 DIAGNOSIS — I49.8 BIGEMINY: ICD-10-CM

## 2023-08-17 DIAGNOSIS — I42.9 CARDIOMYOPATHY, UNSPECIFIED TYPE (H): ICD-10-CM

## 2023-08-17 DIAGNOSIS — O34.219 PREVIOUS CESAREAN DELIVERY, DELIVERED: Primary | ICD-10-CM

## 2023-08-17 PROCEDURE — 93246 EXT ECG>7D<15D RECORDING: CPT

## 2023-08-17 PROCEDURE — 93248 EXT ECG>7D<15D REV&INTERPJ: CPT | Performed by: INTERNAL MEDICINE

## 2023-08-17 PROCEDURE — 93005 ELECTROCARDIOGRAM TRACING: CPT | Mod: 59 | Performed by: INTERNAL MEDICINE

## 2023-08-17 PROCEDURE — 99204 OFFICE O/P NEW MOD 45 MIN: CPT | Mod: 25 | Performed by: INTERNAL MEDICINE

## 2023-08-17 NOTE — PROGRESS NOTES
HPI:     This is a 48 yr old with no major PMH here for evaluation. I see her partner in clinic. No dizziness or syncope. No SOB or CP. EKG at PCP office showed bigeminy. Echo with left atrial enlargement and low normal EF. She reports some leg swelling that seems new.    No fam history. No etoh use. No HTN history.     ASSESSMENT/PLAN:    Cardiomyopathy: low normal EF, 50%.   -check ziopatch for PVC burden, other arrhythmias   -cardiac MRI to evaluate further left atrial enlargement, myopathy   -depending on results will start appropriate medical therapy (beta blocker, ACEI)    Follow up RODERICK in 2 months    Saumya Dennison MD [facility]      PAST MEDICAL HISTORY  No past medical history on file.    CURRENT MEDICATIONS  Current Outpatient Medications   Medication Sig Dispense Refill    Cholecalciferol (D3 VITAMIN PO)       Multiple Vitamins-Minerals (MULTIVITAMIN GUMMIES ADULT PO)       norgestimate-ethinyl estradiol (ORTHO-CYCLEN) 0.25-35 MG-MCG tablet Take 1 tablet by mouth daily 84 tablet 3    triamcinolone (KENALOG) 0.1 % external cream Apply topically 2 times daily 80 g 3    Calcium Carbonate (CALCIUM 600 PO)  (Patient not taking: Reported on 8/17/2023)       PAST SURGICAL HISTORY:  Past Surgical History:   Procedure Laterality Date    C/SECTION, LOW TRANSVERSE  07/01/2007       ALLERGIES   No Known Allergies    FAMILY HISTORY  Family History   Problem Relation Age of Onset    Asthma Mother     Cancer Father         liver, ETOH abuse         SOCIAL HISTORY  Social History     Socioeconomic History    Marital status:      Spouse name: Not on file    Number of children: 1    Years of education: Not on file    Highest education level: Not on file   Occupational History     Employer: NONE    Tobacco Use    Smoking status: Never    Smokeless tobacco: Never   Vaping Use    Vaping Use: Never used   Substance and Sexual Activity    Alcohol use: Yes     Comment: rare    Drug use: No    Sexual activity: Yes      Partners: Male     Birth control/protection: Pill   Other Topics Concern    Parent/sibling w/ CABG, MI or angioplasty before 65F 55M? Not Asked   Social History Narrative    Not on file     Social Determinants of Health     Financial Resource Strain: Not on file   Food Insecurity: Not on file   Transportation Needs: Not on file   Physical Activity: Not on file   Stress: Not on file   Social Connections: Not on file   Intimate Partner Violence: Not on file   Housing Stability: Not on file       ROS:   Constitutional: No fever, chills, or sweats. No weight gain/loss   ENT: No visual disturbance, ear ache, epistaxis, sore throat  Allergies/Immunologic: Negative  Respiratory: No cough, hemoptysia  Cardiovascular: As per HPI  GI: No nausea, vomiting, hematemesis, melena, or hematochezia  : No urinary frequency, dysuria, or hematuria  Integument: Negative  Psychiatric: Negative  Neuro: Negative  Endocrinology: Negative   Musculoskeletal: Negative  Vascular: No walking impairment, claudication, ischemic rest pain or nonhealing wounds    EXAM:  /80 (BP Location: Right arm, Patient Position: Sitting)   Pulse 65   Wt 68.9 kg (152 lb)   LMP 07/21/2023 (Approximate)   SpO2 97%   BMI 28.03 kg/m    In general, the patient is a pleasant female in no apparent distress.    HEENT: NC/AT.  PERRLA.  EOMI.  Sclerae white, not injected.    Neck: No adenopathy.  No thyromegaly. Carotids +2/2 bilaterally without bruits.  No jugular venous distension.   Heart: RRR. Normal S1, S2 splits physiologically. No murmur, rub, click, or gallop.   Lungs: CTA.  No ronchi, wheezes, rales.  No dullness to percussion.   Abdomen: Soft, nontender, nondistended. No organomegaly. No AAA.  No bruits.   Extremities: No clubbing, cyanosis, or edema.    Vascular: No bruits are noted.    Labs:  LIPID RESULTS:  Lab Results   Component Value Date    CHOL 170 08/03/2023    CHOL 210 (H) 02/04/2021    HDL 65 08/03/2023    HDL 94 02/04/2021    LDL 64  08/03/2023    LDL 95 02/04/2021    TRIG 205 (H) 08/03/2023    TRIG 105 02/04/2021    NHDL 105 08/03/2023    NHDL 116 02/04/2021       LIVER ENZYME RESULTS:  No results found for: AST, ALT    CBC RESULTS:  Lab Results   Component Value Date    WBC 12.8 (H) 08/03/2023    WBC 17.9 (H) 07/25/2007    RBC 5.31 (H) 08/03/2023    RBC 4.70 07/25/2007    HGB 12.7 08/03/2023    HGB 9.1 (L) 07/26/2007    HCT 39.1 08/03/2023    HCT 28.0 (L) 07/26/2007    MCV 74 (L) 08/03/2023    MCV 80 07/25/2007    MCH 23.9 (L) 08/03/2023    MCH 26.6 07/25/2007    MCHC 32.5 08/03/2023    MCHC 33.3 07/25/2007    RDW 14.8 08/03/2023    RDW 13.8 07/25/2007     08/03/2023     (L) 07/25/2007       BMP RESULTS:  Lab Results   Component Value Date     08/03/2023     02/04/2021    POTASSIUM 4.1 08/03/2023    POTASSIUM 3.9 07/13/2022    POTASSIUM 4.1 02/04/2021    CHLORIDE 102 08/03/2023    CHLORIDE 108 07/13/2022    CHLORIDE 108 02/04/2021    CO2 25 08/03/2023    CO2 26 07/13/2022    CO2 27 02/04/2021    ANIONGAP 11 08/03/2023    ANIONGAP 5 07/13/2022    ANIONGAP 1 (L) 02/04/2021    GLC 82 08/03/2023    GLC 87 07/13/2022    GLC 79 02/04/2021    BUN 10.8 08/03/2023    BUN 10 07/13/2022    BUN 10 02/04/2021    CR 0.74 08/03/2023    CR 0.62 02/04/2021    GFRESTIMATED >90 08/03/2023    GFRESTIMATED >90 02/04/2021    GFRESTBLACK >90 02/04/2021    DION 9.2 08/03/2023    DION 8.9 02/04/2021        A1C RESULTS:  No results found for: A1C

## 2023-08-17 NOTE — LETTER
8/17/2023    Camille Wilson MD  No address on file    RE: Michael Marmolejo       Dear Colleague,     I had the pleasure of seeing Michael Marmolejo in the [facility].      HPI:     This is a 48 yr old with no major PMH here for evaluation. I see her partner in clinic. No dizziness or syncope. No SOB or CP. EKG at PCP office showed bigeminy. Echo with left atrial enlargement and low normal EF. She reports some leg swelling that seems new.    No fam history. No etoh use. No HTN history.     ASSESSMENT/PLAN:    Cardiomyopathy: low normal EF, 50%.   -check ziopatch for PVC burden, other arrhythmias   -cardiac MRI to evaluate further left atrial enlargement, myopathy   -depending on results will start appropriate medical therapy (beta blocker, ACEI)    Follow up RODERICK in 2 months    Saumya Dennison MD [facility]      PAST MEDICAL HISTORY  No past medical history on file.    CURRENT MEDICATIONS  Current Outpatient Medications   Medication Sig Dispense Refill    Cholecalciferol (D3 VITAMIN PO)       Multiple Vitamins-Minerals (MULTIVITAMIN GUMMIES ADULT PO)       norgestimate-ethinyl estradiol (ORTHO-CYCLEN) 0.25-35 MG-MCG tablet Take 1 tablet by mouth daily 84 tablet 3    triamcinolone (KENALOG) 0.1 % external cream Apply topically 2 times daily 80 g 3    Calcium Carbonate (CALCIUM 600 PO)  (Patient not taking: Reported on 8/17/2023)       PAST SURGICAL HISTORY:  Past Surgical History:   Procedure Laterality Date    C/SECTION, LOW TRANSVERSE  07/01/2007       ALLERGIES   No Known Allergies    FAMILY HISTORY  Family History   Problem Relation Age of Onset    Asthma Mother     Cancer Father         liver, ETOH abuse         SOCIAL HISTORY  Social History     Socioeconomic History    Marital status:      Spouse name: Not on file    Number of children: 1    Years of education: Not on file    Highest education level: Not on file   Occupational History     Employer: NONE    Tobacco Use    Smoking status: Never     Smokeless tobacco: Never   Vaping Use    Vaping Use: Never used   Substance and Sexual Activity    Alcohol use: Yes     Comment: rare    Drug use: No    Sexual activity: Yes     Partners: Male     Birth control/protection: Pill   Other Topics Concern    Parent/sibling w/ CABG, MI or angioplasty before 65F 55M? Not Asked   Social History Narrative    Not on file     Social Determinants of Health     Financial Resource Strain: Not on file   Food Insecurity: Not on file   Transportation Needs: Not on file   Physical Activity: Not on file   Stress: Not on file   Social Connections: Not on file   Intimate Partner Violence: Not on file   Housing Stability: Not on file       ROS:   Constitutional: No fever, chills, or sweats. No weight gain/loss   ENT: No visual disturbance, ear ache, epistaxis, sore throat  Allergies/Immunologic: Negative  Respiratory: No cough, hemoptysia  Cardiovascular: As per HPI  GI: No nausea, vomiting, hematemesis, melena, or hematochezia  : No urinary frequency, dysuria, or hematuria  Integument: Negative  Psychiatric: Negative  Neuro: Negative  Endocrinology: Negative   Musculoskeletal: Negative  Vascular: No walking impairment, claudication, ischemic rest pain or nonhealing wounds    EXAM:  /80 (BP Location: Right arm, Patient Position: Sitting)   Pulse 65   Wt 68.9 kg (152 lb)   LMP 07/21/2023 (Approximate)   SpO2 97%   BMI 28.03 kg/m    In general, the patient is a pleasant female in no apparent distress.    HEENT: NC/AT.  PERRLA.  EOMI.  Sclerae white, not injected.    Neck: No adenopathy.  No thyromegaly. Carotids +2/2 bilaterally without bruits.  No jugular venous distension.   Heart: RRR. Normal S1, S2 splits physiologically. No murmur, rub, click, or gallop.   Lungs: CTA.  No ronchi, wheezes, rales.  No dullness to percussion.   Abdomen: Soft, nontender, nondistended. No organomegaly. No AAA.  No bruits.   Extremities: No clubbing, cyanosis, or edema.    Vascular: No bruits  are noted.    Labs:  LIPID RESULTS:  Lab Results   Component Value Date    CHOL 170 08/03/2023    CHOL 210 (H) 02/04/2021    HDL 65 08/03/2023    HDL 94 02/04/2021    LDL 64 08/03/2023    LDL 95 02/04/2021    TRIG 205 (H) 08/03/2023    TRIG 105 02/04/2021    NHDL 105 08/03/2023    NHDL 116 02/04/2021       LIVER ENZYME RESULTS:  No results found for: AST, ALT    CBC RESULTS:  Lab Results   Component Value Date    WBC 12.8 (H) 08/03/2023    WBC 17.9 (H) 07/25/2007    RBC 5.31 (H) 08/03/2023    RBC 4.70 07/25/2007    HGB 12.7 08/03/2023    HGB 9.1 (L) 07/26/2007    HCT 39.1 08/03/2023    HCT 28.0 (L) 07/26/2007    MCV 74 (L) 08/03/2023    MCV 80 07/25/2007    MCH 23.9 (L) 08/03/2023    MCH 26.6 07/25/2007    MCHC 32.5 08/03/2023    MCHC 33.3 07/25/2007    RDW 14.8 08/03/2023    RDW 13.8 07/25/2007     08/03/2023     (L) 07/25/2007       BMP RESULTS:  Lab Results   Component Value Date     08/03/2023     02/04/2021    POTASSIUM 4.1 08/03/2023    POTASSIUM 3.9 07/13/2022    POTASSIUM 4.1 02/04/2021    CHLORIDE 102 08/03/2023    CHLORIDE 108 07/13/2022    CHLORIDE 108 02/04/2021    CO2 25 08/03/2023    CO2 26 07/13/2022    CO2 27 02/04/2021    ANIONGAP 11 08/03/2023    ANIONGAP 5 07/13/2022    ANIONGAP 1 (L) 02/04/2021    GLC 82 08/03/2023    GLC 87 07/13/2022    GLC 79 02/04/2021    BUN 10.8 08/03/2023    BUN 10 07/13/2022    BUN 10 02/04/2021    CR 0.74 08/03/2023    CR 0.62 02/04/2021    GFRESTIMATED >90 08/03/2023    GFRESTIMATED >90 02/04/2021    GFRESTBLACK >90 02/04/2021    DION 9.2 08/03/2023    DION 8.9 02/04/2021        A1C RESULTS:  No results found for: A1C        Thank you for allowing me to participate in the care of your patient.      Sincerely,     Saumya Dennison MD     [facility]  cc:   No referring provider defined for this encounter.

## 2023-08-30 ENCOUNTER — HOSPITAL ENCOUNTER (OUTPATIENT)
Dept: CARDIOLOGY | Facility: CLINIC | Age: 48
Discharge: HOME | End: 2023-08-30
Attending: INTERNAL MEDICINE | Admitting: INTERNAL MEDICINE
Payer: COMMERCIAL

## 2023-08-30 VITALS — SYSTOLIC BLOOD PRESSURE: 130 MMHG | HEART RATE: 76 BPM | DIASTOLIC BLOOD PRESSURE: 89 MMHG

## 2023-08-30 DIAGNOSIS — I42.9 CARDIOMYOPATHY, UNSPECIFIED TYPE (H): ICD-10-CM

## 2023-08-30 PROCEDURE — 75561 CARDIAC MRI FOR MORPH W/DYE: CPT

## 2023-08-30 PROCEDURE — A9585 GADOBUTROL INJECTION: HCPCS | Performed by: INTERNAL MEDICINE

## 2023-08-30 PROCEDURE — 250N000011 HC RX IP 250 OP 636: Performed by: INTERNAL MEDICINE

## 2023-08-30 PROCEDURE — 75561 CARDIAC MRI FOR MORPH W/DYE: CPT | Mod: 26 | Performed by: INTERNAL MEDICINE

## 2023-08-30 PROCEDURE — 255N000002 HC RX 255 OP 636: Performed by: INTERNAL MEDICINE

## 2023-08-30 RX ORDER — ACYCLOVIR 200 MG/1
0-1 CAPSULE ORAL
Status: DISCONTINUED | OUTPATIENT
Start: 2023-08-30 | End: 2023-08-31 | Stop reason: HOSPADM

## 2023-08-30 RX ORDER — METHYLPREDNISOLONE SODIUM SUCCINATE 125 MG/2ML
125 INJECTION, POWDER, LYOPHILIZED, FOR SOLUTION INTRAMUSCULAR; INTRAVENOUS
Status: DISCONTINUED | OUTPATIENT
Start: 2023-08-30 | End: 2023-08-31 | Stop reason: HOSPADM

## 2023-08-30 RX ORDER — DIPHENHYDRAMINE HYDROCHLORIDE 50 MG/ML
25-50 INJECTION INTRAMUSCULAR; INTRAVENOUS
Status: DISCONTINUED | OUTPATIENT
Start: 2023-08-30 | End: 2023-08-31 | Stop reason: HOSPADM

## 2023-08-30 RX ORDER — GADOBUTROL 604.72 MG/ML
9 INJECTION INTRAVENOUS ONCE
Status: COMPLETED | OUTPATIENT
Start: 2023-08-30 | End: 2023-08-30

## 2023-08-30 RX ORDER — DIPHENHYDRAMINE HCL 25 MG
25 CAPSULE ORAL
Status: DISCONTINUED | OUTPATIENT
Start: 2023-08-30 | End: 2023-08-31 | Stop reason: HOSPADM

## 2023-08-30 RX ORDER — DIAZEPAM 5 MG
5 TABLET ORAL EVERY 30 MIN PRN
Status: DISCONTINUED | OUTPATIENT
Start: 2023-08-30 | End: 2023-08-31 | Stop reason: HOSPADM

## 2023-08-30 RX ORDER — ONDANSETRON 2 MG/ML
4 INJECTION INTRAMUSCULAR; INTRAVENOUS
Status: DISCONTINUED | OUTPATIENT
Start: 2023-08-30 | End: 2023-08-31 | Stop reason: HOSPADM

## 2023-08-30 RX ADMIN — GADOBUTROL 9 ML: 604.72 INJECTION INTRAVENOUS at 12:16

## 2023-08-30 RX ADMIN — LIDOCAINE HYDROCHLORIDE 100 MG: 20 INJECTION, SOLUTION EPIDURAL; INFILTRATION; INTRACAUDAL; PERINEURAL at 11:19

## 2023-09-06 ENCOUNTER — TELEPHONE (OUTPATIENT)
Dept: CARDIOLOGY | Facility: CLINIC | Age: 48
End: 2023-09-06
Payer: COMMERCIAL

## 2023-09-06 DIAGNOSIS — I49.3 PVC'S (PREMATURE VENTRICULAR CONTRACTIONS): Primary | ICD-10-CM

## 2023-09-06 NOTE — TELEPHONE ENCOUNTER
Hitesho done to assess low normal EF of 50% on echo. Results as follows: Predominant rhythm sinus  with avg HR 86; freq PVCs (~27% burden); no symptoms reported. Follow up with Елена Leger NP on 10/13/23. Will discuss with Dr Dennison for recommendations prior to visit. Lesa Griffin RN Cardiology September 6, 2023, 8:02 AM

## 2023-09-06 NOTE — RESULT ENCOUNTER NOTE
Predominant rhythm sinus  with avg HR 86; freq PVCs (~27% burden); no symptoms reported. Follow up with Елена Leger NP on 10/13/23. Will discuss with Dr Dennison for recommendations prior to visit.

## 2023-09-07 RX ORDER — METOPROLOL TARTRATE 25 MG/1
25 TABLET, FILM COATED ORAL 2 TIMES DAILY
Qty: 60 TABLET | Refills: 11 | Status: SHIPPED | OUTPATIENT
Start: 2023-09-07 | End: 2023-09-15

## 2023-09-07 NOTE — TELEPHONE ENCOUNTER
Thanks! Recommend starting a beta blocker, 25 mg twice a day. We need to suppress the PVCs as this could be causing her cardiomyopathy. Then the RODERICK follow up visit can see where she is at with symptoms and likely repeat a Holter monitor for 48 hours at that time. If this is that significant ongoing then she will need to see EP.   Dr Raman WALKER     Reviewed with Dr. Dennison. She recommends Metoprolol tartrate 25mg BID.     Ann Ortega RN

## 2023-09-07 NOTE — TELEPHONE ENCOUNTER
Pt called back with . Reviewed results and recommendations. Pt agreeable to trying Metoprolol tartrate 25mg BID. Rx sent to pharmacy. Pt will follow up with Елена Leger CNP on 10/13/23 as planned.   Pt and  verbalized an understanding.     Ann Ortega RN

## 2023-09-13 ENCOUNTER — ANCILLARY ORDERS (OUTPATIENT)
Dept: FAMILY MEDICINE | Facility: CLINIC | Age: 48
End: 2023-09-13

## 2023-09-13 ENCOUNTER — HOSPITAL ENCOUNTER (OUTPATIENT)
Dept: MAMMOGRAPHY | Facility: CLINIC | Age: 48
Discharge: HOME | End: 2023-09-13
Attending: NURSE PRACTITIONER | Admitting: NURSE PRACTITIONER
Payer: COMMERCIAL

## 2023-09-13 DIAGNOSIS — Z11.59 NEED FOR HEPATITIS C SCREENING TEST: ICD-10-CM

## 2023-09-13 DIAGNOSIS — Z30.41 ENCOUNTER FOR SURVEILLANCE OF CONTRACEPTIVE PILLS: ICD-10-CM

## 2023-09-13 DIAGNOSIS — Z12.31 VISIT FOR SCREENING MAMMOGRAM: ICD-10-CM

## 2023-09-13 DIAGNOSIS — Z00.00 ANNUAL PHYSICAL EXAM: Primary | ICD-10-CM

## 2023-09-13 DIAGNOSIS — I49.8 BIGEMINY: ICD-10-CM

## 2023-09-13 DIAGNOSIS — Z12.11 SCREEN FOR COLON CANCER: ICD-10-CM

## 2023-09-13 PROCEDURE — 77067 SCR MAMMO BI INCL CAD: CPT

## 2023-09-15 ENCOUNTER — OFFICE VISIT (OUTPATIENT)
Dept: FAMILY MEDICINE | Facility: CLINIC | Age: 48
End: 2023-09-15
Payer: COMMERCIAL

## 2023-09-15 VITALS
HEART RATE: 42 BPM | DIASTOLIC BLOOD PRESSURE: 88 MMHG | RESPIRATION RATE: 16 BRPM | BODY MASS INDEX: 28.3 KG/M2 | TEMPERATURE: 97.6 F | SYSTOLIC BLOOD PRESSURE: 112 MMHG | WEIGHT: 153.5 LBS | OXYGEN SATURATION: 97 %

## 2023-09-15 DIAGNOSIS — I42.9 CARDIOMYOPATHY, UNSPECIFIED TYPE (H): Primary | ICD-10-CM

## 2023-09-15 DIAGNOSIS — R00.1 BRADYCARDIA: ICD-10-CM

## 2023-09-15 DIAGNOSIS — L30.9 ECZEMA, UNSPECIFIED TYPE: ICD-10-CM

## 2023-09-15 DIAGNOSIS — Z23 NEEDS FLU SHOT: ICD-10-CM

## 2023-09-15 PROBLEM — I50.9 CHF (CONGESTIVE HEART FAILURE) (H): Status: ACTIVE | Noted: 2023-09-15

## 2023-09-15 PROCEDURE — 90686 IIV4 VACC NO PRSV 0.5 ML IM: CPT | Performed by: NURSE PRACTITIONER

## 2023-09-15 PROCEDURE — 99214 OFFICE O/P EST MOD 30 MIN: CPT | Mod: 25 | Performed by: NURSE PRACTITIONER

## 2023-09-15 PROCEDURE — 90471 IMMUNIZATION ADMIN: CPT | Performed by: NURSE PRACTITIONER

## 2023-09-15 RX ORDER — METOPROLOL SUCCINATE 25 MG/1
25 TABLET, EXTENDED RELEASE ORAL DAILY
Qty: 30 TABLET | Refills: 1 | Status: SHIPPED | OUTPATIENT
Start: 2023-09-15 | End: 2023-10-13

## 2023-09-15 RX ORDER — BETAMETHASONE DIPROPIONATE 0.5 MG/G
CREAM TOPICAL 2 TIMES DAILY
Qty: 45 G | Refills: 2 | Status: SHIPPED | OUTPATIENT
Start: 2023-09-15

## 2023-09-15 ASSESSMENT — PAIN SCALES - GENERAL: PAINLEVEL: NO PAIN (0)

## 2023-09-15 NOTE — PROGRESS NOTES
Assessment & Plan     Cardiomyopathy, unspecified type (H)  -patient did not tolerate Metoprolol at 25 mg twice daily, developed significant bradycardia, HR during office visit between 38-42 bmp. Patient complains of fatigue, denies shortness of breath, chest pain, complains of dizziness since starting Metoprolol  -recommended to reduce to 25 mg daily, follow up with cardiology in October, as scheduled   - metoprolol succinate ER (TOPROL XL) 25 MG 24 hr tablet; Take 1 tablet (25 mg) by mouth daily    Needs flu shot    - INFLUENZA VACCINE IM > 6 MONTHS VALENT IIV4 (AFLURIA/FLUZONE)    Eczema, unspecified type  -possible nummular eczema, patient is having several spots on her abdomen, bilateral legs, inner thighs, coin shaped erythematous lesions with scaly skin   -start betamethasone dipropionate (DIPROSONE) 0.05 % external cream; Apply topically 2 times daily  - follow up with dermatology, if no improvement, Adult Dermatology Referral; Future    Bradycardia  -decreased Metoprolol, see plan as above       HOA [facility]    Asael Farley is a 48 year old, presenting for the following health issues:  Derm Problem        9/15/2023     6:36 AM   Additional Questions   Roomed by uli       History of Present Illness       Reason for visit:  Rash    She eats 4 or more servings of fruits and vegetables daily.She consumes 0 sweetened beverage(s) daily.She exercises with enough effort to increase her heart rate 9 or less minutes per day.  She exercises with enough effort to increase her heart rate 3 or less days per week.   She is taking medications regularly.       Rash  Onset/Duration: since last week   Description  Location: between her thighs, under left arm   Character: red, bumpy   Itching: mild  Intensity:  mild  Progression of Symptoms:  worsening  Accompanying signs and symptoms:   Fever: No  Body aches or joint pain: No  Sore throat symptoms: No  Recent cold  symptoms: No  History:           Previous episodes of similar rash: YES  New exposures:  None  Recent travel: No  Exposure to similar rash: YES  Precipitating or alleviating factors: none   Therapies tried and outcome: benadryl, cortizone  medrol pack in July         Review of Systems   Constitutional, HEENT, cardiovascular, pulmonary, gi and gu systems are negative, except as otherwise noted.      Objective    /88   Pulse (!) 42   Temp 97.6  F (36.4  C) (Tympanic)   Resp 16   Wt 69.6 kg (153 lb 8 oz)   LMP 07/21/2023 (Approximate)   SpO2 97%   BMI 28.30 kg/m    Body mass index is 28.3 kg/m .  Physical Exam   GENERAL: healthy, alert and no distress  EYES: Eyes grossly normal to inspection, PERRL and conjunctivae and sclerae normal  RESP: lungs clear to auscultation - no rales, rhonchi or wheezes  CV: bradycardia, normal S1 S2, no S3 or S4, no murmur, click or rub, peripheral pulses strong, and no peripheral edema  SKIN: eczema-several round erythematous lesions with scaly skin on bilateral inner thighs, abdomen and lower legs   PSYCH: mentation appears normal, affect normal/bright

## 2023-10-13 ENCOUNTER — OFFICE VISIT (OUTPATIENT)
Dept: CARDIOLOGY | Facility: CLINIC | Age: 48
End: 2023-10-13
Attending: INTERNAL MEDICINE
Payer: COMMERCIAL

## 2023-10-13 VITALS
SYSTOLIC BLOOD PRESSURE: 132 MMHG | DIASTOLIC BLOOD PRESSURE: 74 MMHG | WEIGHT: 153.2 LBS | BODY MASS INDEX: 27.14 KG/M2 | HEIGHT: 63 IN | OXYGEN SATURATION: 98 % | RESPIRATION RATE: 16 BRPM | HEART RATE: 39 BPM

## 2023-10-13 DIAGNOSIS — I42.9 CARDIOMYOPATHY, UNSPECIFIED TYPE (H): ICD-10-CM

## 2023-10-13 DIAGNOSIS — I42.8 OTHER CARDIOMYOPATHY (H): ICD-10-CM

## 2023-10-13 DIAGNOSIS — R07.9 CHEST PAIN, UNSPECIFIED TYPE: ICD-10-CM

## 2023-10-13 DIAGNOSIS — I49.3 PVC'S (PREMATURE VENTRICULAR CONTRACTIONS): Primary | ICD-10-CM

## 2023-10-13 PROCEDURE — 99214 OFFICE O/P EST MOD 30 MIN: CPT | Performed by: NURSE PRACTITIONER

## 2023-10-13 RX ORDER — METOPROLOL SUCCINATE 50 MG/1
50 TABLET, EXTENDED RELEASE ORAL DAILY
Qty: 30 TABLET | Refills: 11 | Status: SHIPPED | OUTPATIENT
Start: 2023-10-13 | End: 2023-10-30 | Stop reason: DRUGHIGH

## 2023-10-13 ASSESSMENT — PAIN SCALES - GENERAL: PAINLEVEL: NO PAIN (0)

## 2023-10-13 NOTE — PATIENT INSTRUCTIONS
Medication Changes:  INCREASE metoprolol XL to 50 mg daily and take in the evening     Recommendations:  Check daily weights and call the clinic if your weight has increased more than 2 lbs in one day or 5 lbs in one week; if you feel more short of breath or have worsening swelling in your legs or abdomen.   Call if blood pressure is less than 90 on top or less than 100 with lightheadedness.       Follow-up:  Cardiology follow up at [facility]: GREGG Christiansen in 1 month with 48-hour Holter monitor prior.   Stress test     Cardiology Scheduling~[phone]  Cardiology Clinic RN~[phone] (Lesa RN, Ann RN, Nelly RN)

## 2023-10-13 NOTE — LETTER
10/13/2023    Camille Wilson MD  No address on file    RE: Michael Marmolejo       Dear Colleague,     I had the pleasure of seeing Michael Marmolejo in the [facility].  Cardiology Clinic Progress Note  Michael Marmolejo MRN# [id number]   YOB: 1975 Age: 48 year old      Primary Cardiologist:   Dr. Dennison    Patient presents today for testing follow-up          History of Presenting Illness:      Michael Marmolejo is a pleasant 48 year old patient with a past cardiac history significant for   PVC's (premature ventricular contractions)  27% burden Holter 8/2023-started beta-blocker  Other cardiomyopathy (H)  EF 50 to 55%, no WMA  Etiology: Frequent PVCs      Patient was seen by Dr. Dennison in August 2023 in consultation for carlos.  Echocardiogram showed left atrial enlargement and low normal EF 50 to 55%.  Patient has new lower extremity edema.      Most recent lipid profile, BMP, TSH, hemoglobin reviewed today. Zio patch August 2023 showed sinus rhythm with average heart rate 86 bpm, frequent PVCs 27% burden and no symptoms reported.  This was reviewed by Dr. Dennison who recommended starting Lopressor 25 mg twice daily. Cardiac MRI August 2023 showing limited study due to frequent PVCs.  She had LVEF 50 to 55%, no WMA, normal RV, mild LA enlargement, and no evidence of infarction, inflammation, or infiltration.  Results reviewed today.    After starting metoprolol tartrate 25 mg twice daily, she has had some fatigue.  She had a visit with PCP recently and because of heart rates appear bradycardic they decreased her dose to 25 mg daily and changed to succinate.  She has been taking this in the morning and continues to have some symptoms of fatigue.  She is agreeable to switching this to the evening to see if the side effects improve.  If not we discussed switching to diltiazem.  She has occasional palpitations.  She has cut out caffeine and does not drink any alcohol.  She denies any significant  heart failure symptoms and has very mild edema.  She reports chest discomfort when walking up an incline in her yard but denies any chest discomfort when walking on flat surface.  She can generally walk a couple miles per day on a flat surface.  She denies any family history of CAD. Patient reports no  shortness of breath, PND, orthopnea, presyncope, syncope, edema, heart racing.                       Assessment and Plan:       Plan  Patient Instructions   Medication Changes:  INCREASE metoprolol XL to 50 mg daily and take in the evening for PVCs     Recommendations:  Check daily weights and call the clinic if your weight has increased more than 2 lbs in one day or 5 lbs in one week; if you feel more short of breath or have worsening swelling in your legs or abdomen.   Call if blood pressure is less than 90 on top or less than 100 with lightheadedness.       Follow-up:  Cardiology follow up at [facility]: GREGG Christiansen in 1 month with 48-hour Holter monitor prior.   Stress test     Cardiology Scheduling~[phone]  Cardiology Clinic RN~[phone] (Lesa RN, Ann RN, Nelly RN)            PVC's (premature ventricular contractions)  Reassess Holter monitor after previously started beta-blocker  Uptitrate beta-blocker as needed  Consider EP consult if needed    Other cardiomyopathy (H)  No symptoms of heart failure  Continue beta-blocker  Reassess EF after PVC burden improved              Respiratory:  clear to auscultation; normal symmetry        Cardiac: regular rate and irregular rhythm    GI:  abdomen nondistended     Extremities and Muscular Skeletal:   no edema            Thank you for allowing me to participate in this delightful patient's care.      This note was completed in part using Dragon voice recognition software. Although reviewed after completion, some word and grammatical errors may occur.    Елена Geronimo, APRN CNP      Thank you for allowing me to participate in the care of  your patient.      Sincerely,     HOA Meng [facility]  cc:   Saumya Dennison MD  [address] [place] [id number]

## 2023-10-13 NOTE — PROGRESS NOTES
Cardiology Clinic Progress Note  Michael Marmolejo MRN# [id number]   YOB: 1975 Age: 48 year old      Primary Cardiologist:   Dr. Dennison    Patient presents today for testing follow-up          History of Presenting Illness:      Michael Marmolejo is a pleasant 48 year old patient with a past cardiac history significant for   PVC's (premature ventricular contractions)  27% burden Holter 8/2023-started beta-blocker  Other cardiomyopathy (H)  EF 50 to 55%, no WMA  Etiology: Frequent PVCs      Patient was seen by Dr. Dennison in August 2023 in consultation for carlos.  Echocardiogram showed left atrial enlargement and low normal EF 50 to 55%.  Patient has new lower extremity edema.      Most recent lipid profile, BMP, TSH, hemoglobin reviewed today. Zio patch August 2023 showed sinus rhythm with average heart rate 86 bpm, frequent PVCs 27% burden and no symptoms reported.  This was reviewed by Dr. Dennison who recommended starting Lopressor 25 mg twice daily. Cardiac MRI August 2023 showing limited study due to frequent PVCs.  She had LVEF 50 to 55%, no WMA, normal RV, mild LA enlargement, and no evidence of infarction, inflammation, or infiltration.  Results reviewed today.    After starting metoprolol tartrate 25 mg twice daily, she has had some fatigue.  She had a visit with PCP recently and because of heart rates appear bradycardic they decreased her dose to 25 mg daily and changed to succinate.  She has been taking this in the morning and continues to have some symptoms of fatigue.  She is agreeable to switching this to the evening to see if the side effects improve.  If not we discussed switching to diltiazem.  She has occasional palpitations.  She has cut out caffeine and does not drink any alcohol.  She denies any significant heart failure symptoms and has very mild edema.  She reports chest discomfort when walking up an incline in her yard but denies any chest discomfort when walking on flat surface.  She  can generally walk a couple miles per day on a flat surface.  She denies any family history of CAD. Patient reports no  shortness of breath, PND, orthopnea, presyncope, syncope, edema, heart racing.                       Assessment and Plan:       Plan  Patient Instructions   Medication Changes:  INCREASE metoprolol XL to 50 mg daily and take in the evening for PVCs     Recommendations:  Check daily weights and call the clinic if your weight has increased more than 2 lbs in one day or 5 lbs in one week; if you feel more short of breath or have worsening swelling in your legs or abdomen.   Call if blood pressure is less than 90 on top or less than 100 with lightheadedness.       Follow-up:  Cardiology follow up at [facility]: GREGG Christiansen in 1 month with 48-hour Holter monitor prior.   Stress test     Cardiology Scheduling~[phone]  Cardiology Clinic RN~[phone] (Lesa RN, Ann RN, Nelly RN)            PVC's (premature ventricular contractions)  Reassess Holter monitor after previously started beta-blocker  Uptitrate beta-blocker as needed  Consider EP consult if needed    Other cardiomyopathy (H)  No symptoms of heart failure  Continue beta-blocker  Reassess EF after PVC burden improved              Respiratory:  clear to auscultation; normal symmetry        Cardiac: regular rate and irregular rhythm    GI:  abdomen nondistended     Extremities and Muscular Skeletal:   no edema            Thank you for allowing me to participate in this delightful patient's care.      This note was completed in part using Dragon voice recognition software. Although reviewed after completion, some word and grammatical errors may occur.    Елена Geronimo, HOA CNP

## 2023-10-18 ENCOUNTER — HOSPITAL ENCOUNTER (OUTPATIENT)
Dept: NUCLEAR MEDICINE | Facility: CLINIC | Age: 48
Setting detail: NUCLEAR MEDICINE
Discharge: HOME | End: 2023-10-18
Attending: NURSE PRACTITIONER
Payer: COMMERCIAL

## 2023-10-18 ENCOUNTER — HOSPITAL ENCOUNTER (OUTPATIENT)
Dept: CARDIOLOGY | Facility: CLINIC | Age: 48
Discharge: HOME | End: 2023-10-18
Attending: NURSE PRACTITIONER
Payer: COMMERCIAL

## 2023-10-18 DIAGNOSIS — I49.3 PVC'S (PREMATURE VENTRICULAR CONTRACTIONS): ICD-10-CM

## 2023-10-18 DIAGNOSIS — R07.9 CHEST PAIN, UNSPECIFIED TYPE: ICD-10-CM

## 2023-10-18 LAB
CV STRESS MAX HR HE: 164
RATE PRESSURE PRODUCT: NORMAL
STRESS ANGINA INDEX: 0
STRESS ECHO BASELINE DIASTOLIC HE: 70
STRESS ECHO BASELINE HR: 78 BPM
STRESS ECHO BASELINE SYSTOLIC BP: 132
STRESS ECHO CALCULATED PERCENT HR: 95 %
STRESS ECHO LAST STRESS DIASTOLIC BP: 80
STRESS ECHO LAST STRESS SYSTOLIC BP: 182
STRESS ECHO POST ESTIMATED WORKLOAD: 10.1 METS
STRESS ECHO POST EXERCISE DUR MIN: 9 MIN
STRESS ECHO POST EXERCISE DUR SEC: 15 SEC
STRESS ECHO TARGET HR: 172

## 2023-10-18 PROCEDURE — 78452 HT MUSCLE IMAGE SPECT MULT: CPT

## 2023-10-18 PROCEDURE — 93018 CV STRESS TEST I&R ONLY: CPT | Performed by: INTERNAL MEDICINE

## 2023-10-18 PROCEDURE — 93017 CV STRESS TEST TRACING ONLY: CPT

## 2023-10-18 PROCEDURE — 78452 HT MUSCLE IMAGE SPECT MULT: CPT | Mod: 26 | Performed by: INTERNAL MEDICINE

## 2023-10-18 PROCEDURE — 343N000001 HC RX 343: Performed by: NURSE PRACTITIONER

## 2023-10-18 PROCEDURE — 93016 CV STRESS TEST SUPVJ ONLY: CPT | Performed by: INTERNAL MEDICINE

## 2023-10-18 PROCEDURE — A9502 TC99M TETROFOSMIN: HCPCS | Performed by: NURSE PRACTITIONER

## 2023-10-18 RX ADMIN — TETROFOSMIN 33.1 MILLICURIE: 1.38 INJECTION, POWDER, LYOPHILIZED, FOR SOLUTION INTRAVENOUS at 10:00

## 2023-10-18 RX ADMIN — TETROFOSMIN 10.6 MILLICURIE: 1.38 INJECTION, POWDER, LYOPHILIZED, FOR SOLUTION INTRAVENOUS at 08:05

## 2023-10-23 ENCOUNTER — HOSPITAL ENCOUNTER (OUTPATIENT)
Dept: CARDIOLOGY | Facility: CLINIC | Age: 48
Discharge: HOME | End: 2023-10-23
Attending: NURSE PRACTITIONER | Admitting: NURSE PRACTITIONER
Payer: COMMERCIAL

## 2023-10-23 DIAGNOSIS — I49.3 PVC'S (PREMATURE VENTRICULAR CONTRACTIONS): ICD-10-CM

## 2023-10-23 PROCEDURE — 93225 XTRNL ECG REC<48 HRS REC: CPT

## 2023-10-23 PROCEDURE — 93227 XTRNL ECG REC<48 HR R&I: CPT | Performed by: INTERNAL MEDICINE

## 2023-10-27 NOTE — RESULT ENCOUNTER NOTE
Principle rhythm sinus 55-96 with avg HR 70; 73,697 VEBs (37% burden); 2 SVE; no symptoms logged during the recording period. August 2023 Zio showed 27% PVC burden. On metoprolol 50 mg daily. Follow up with Елена Leger NP on 11/15/23. Will discuss with Елена for any recommendations prior to visit.

## 2023-10-30 DIAGNOSIS — I49.3 PVC'S (PREMATURE VENTRICULAR CONTRACTIONS): Primary | ICD-10-CM

## 2023-10-30 RX ORDER — METOPROLOL SUCCINATE 100 MG/1
100 TABLET, EXTENDED RELEASE ORAL DAILY
Qty: 30 TABLET | Refills: 11 | Status: SHIPPED | OUTPATIENT
Start: 2023-10-30 | End: 2023-11-02 | Stop reason: ALTCHOICE

## 2023-11-02 ENCOUNTER — TELEPHONE (OUTPATIENT)
Dept: CARDIOLOGY | Facility: CLINIC | Age: 48
End: 2023-11-02
Payer: COMMERCIAL

## 2023-11-02 DIAGNOSIS — I49.3 PVC'S (PREMATURE VENTRICULAR CONTRACTIONS): Primary | ICD-10-CM

## 2023-11-02 RX ORDER — DILTIAZEM HYDROCHLORIDE 240 MG/1
240 CAPSULE, EXTENDED RELEASE ORAL DAILY
Qty: 30 CAPSULE | Refills: 3 | Status: SHIPPED | OUTPATIENT
Start: 2023-11-02 | End: 2024-01-16

## 2023-11-08 ENCOUNTER — HOSPITAL ENCOUNTER (OUTPATIENT)
Dept: CARDIOLOGY | Facility: CLINIC | Age: 48
Discharge: HOME | End: 2023-11-08
Attending: NURSE PRACTITIONER | Admitting: NURSE PRACTITIONER
Payer: COMMERCIAL

## 2023-11-08 DIAGNOSIS — I49.3 PVC'S (PREMATURE VENTRICULAR CONTRACTIONS): ICD-10-CM

## 2023-11-08 PROCEDURE — 93227 XTRNL ECG REC<48 HR R&I: CPT | Performed by: INTERNAL MEDICINE

## 2023-11-08 PROCEDURE — 93226 XTRNL ECG REC<48 HR SCAN A/R: CPT

## 2023-11-10 NOTE — RESULT ENCOUNTER NOTE
"Principle rhythm sinus  with avg HR 79; 33,134 VEBs (30% burden); pt event button pressed once and marked as \"lightheadedness\"--rhythm during this time was sinus with PVCs. Follow up with Елена Leger NP on 11/15/23  "

## 2023-11-15 ENCOUNTER — OFFICE VISIT (OUTPATIENT)
Dept: CARDIOLOGY | Facility: CLINIC | Age: 48
End: 2023-11-15
Attending: NURSE PRACTITIONER
Payer: COMMERCIAL

## 2023-11-15 VITALS
WEIGHT: 152.8 LBS | SYSTOLIC BLOOD PRESSURE: 111 MMHG | HEIGHT: 63 IN | DIASTOLIC BLOOD PRESSURE: 63 MMHG | OXYGEN SATURATION: 98 % | BODY MASS INDEX: 27.07 KG/M2 | RESPIRATION RATE: 16 BRPM | HEART RATE: 63 BPM

## 2023-11-15 DIAGNOSIS — I49.3 PVC'S (PREMATURE VENTRICULAR CONTRACTIONS): ICD-10-CM

## 2023-11-15 DIAGNOSIS — R07.9 CHEST PAIN, UNSPECIFIED TYPE: ICD-10-CM

## 2023-11-15 DIAGNOSIS — I42.9 CARDIOMYOPATHY, UNSPECIFIED TYPE (H): ICD-10-CM

## 2023-11-15 DIAGNOSIS — I42.8 OTHER CARDIOMYOPATHY (H): Primary | ICD-10-CM

## 2023-11-15 PROCEDURE — 99214 OFFICE O/P EST MOD 30 MIN: CPT | Performed by: NURSE PRACTITIONER

## 2023-11-15 NOTE — PROGRESS NOTES
Cardiology Clinic Progress Note  Michael Marmolejo MRN# [id number]   YOB: 1975 Age: 48 year old      Primary Cardiologist:   Dr. Dennison      Patient presents today for testing follow-up          History of Presenting Illness:      Michael Marmolejo is a pleasant 48 year old patient with a past cardiac history significant for   PVC's (premature ventricular contractions)  Does not drink EtOH and has cut out caffeine  27% burden Holter 8/2023- started beta-blocker  37% PVCs 10/2023-switched to diltiazem and increased  30% PVCs 11/2023  Other cardiomyopathy (H)  EF 50 to 55%, no WMA  Etiology: Frequent PVCs        Patient was seen by Dr. Dennison in August 2023 in consultation for carlos.  Echocardiogram showed left atrial enlargement and low normal EF 50 to 55%.  Patient had new lower extremity edema.     She was found to have asymptomatic frequent PVCs and medication has been uptitrated, with little effect on PVC burden.     Most recent lipid profile, BMP, TSH, hemoglobin reviewed today. Exercise nuclear stress test October 2023 showed no ischemia or infarction with normal EF and average exercise capacity.  24-hour Holter monitor October 2023 showed sinus rhythm with 30% PVCs and no symptoms.  Recommended increasing metoprolol XL to 100 mg a day.  Patient had side effects after increasing metoprolol and was instead switched to diltiazem  mg daily.  Follow-up Holter monitor November 2023 showed sinus rhythm with 30% PVCs and event of lightheadedness correlated with sinus rhythm with PVCs.  Results reviewed today.    After switching to diltiazem her prior side effects of fatigue and chest discomfort, on metoprolol, have resolved.  She denies any side effects with diltiazem.  Again, she denies any EtOH and only drinks decaffeinated drinks.  Weight has been stable and denies any heart failure symptoms.  She is agreeable to EP consultation.  She mentions that she previously had stress at home with her daughter  having anxiety issues.  Her daughter has now started on medication and is doing better and she feels less stressed about this. Patient reports no chest pain, shortness of breath, PND, orthopnea, presyncope, syncope, edema, heart racing, or palpitations.                         Assessment and Plan:       Plan  Patient Instructions   Medication Changes:  None     Recommendations:  Call with questions  Check daily weights and call the clinic if your weight has increased more than 2 lbs in one day or 5 lbs in one week; if you feel more short of breath or have worsening swelling in your legs or abdomen.    Follow-up:  Cardiology follow up at [facility]: EP consult.   Dr. Dennison in 4 months     Cardiology Scheduling~[phone]  Cardiology Clinic RN~[phone] (Lesa RN, Ann RN)               Other cardiomyopathy (H)  PVC's (premature ventricular contractions)  Cardiomyopathy, unspecified type (H)  Chest pain, unspecified type      PVC's (premature ventricular contractions)  Asymptomatic  Uptitrate medication as able  Consider EP consult if needed     Other cardiomyopathy (H)  No symptoms of heart failure   Continue CCB, did not tolerate beta-blocker  Reassess EF after PVC burden improved         Respiratory:  clear to auscultation; normal symmetry        Cardiac: regular rate and irregular rhythm     GI:  nondistended     Extremities and Muscular Skeletal:   no edema            Thank you for allowing me to participate in this delightful patient's care.      This note was completed in part using Dragon voice recognition software. Although reviewed after completion, some word and grammatical errors may occur.    Елена Geronimo, HOA CNP

## 2023-11-15 NOTE — LETTER
11/15/2023    Camille Wilson MD  [facility] [address]  [facility] [address]    RE: Michael Marmolejo       Dear Colleague,     I had the pleasure of seeing Michael Marmolejo in the [facility].  Cardiology Clinic Progress Note  Michael Marmolejo MRN# [id number]   YOB: 1975 Age: 48 year old      Primary Cardiologist:   Dr. Dennison      Patient presents today for testing follow-up          History of Presenting Illness:      Michael Marmolejo is a pleasant 48 year old patient with a past cardiac history significant for   PVC's (premature ventricular contractions)  Does not drink EtOH and has cut out caffeine  27% burden Holter 8/2023- started beta-blocker  37% PVCs 10/2023-switched to diltiazem and increased  30% PVCs 11/2023  Other cardiomyopathy (H)  EF 50 to 55%, no WMA  Etiology: Frequent PVCs        Patient was seen by Dr. Dennison in August 2023 in consultation for carlos.  Echocardiogram showed left atrial enlargement and low normal EF 50 to 55%.  Patient had new lower extremity edema.     She was found to have asymptomatic frequent PVCs and medication has been uptitrated, with little effect on PVC burden.     Most recent lipid profile, BMP, TSH, hemoglobin reviewed today. Exercise nuclear stress test October 2023 showed no ischemia or infarction with normal EF and average exercise capacity.  24-hour Holter monitor October 2023 showed sinus rhythm with 30% PVCs and no symptoms.  Recommended increasing metoprolol XL to 100 mg a day.  Patient had side effects after increasing metoprolol and was instead switched to diltiazem  mg daily.  Follow-up Holter monitor November 2023 showed sinus rhythm with 30% PVCs and event of lightheadedness correlated with sinus rhythm with PVCs.  Results reviewed today.    After switching to diltiazem her prior side effects of fatigue and chest discomfort, on metoprolol, have resolved.  She denies any side effects with  diltiazem.  Again, she denies any EtOH and only drinks decaffeinated drinks.  Weight has been stable and denies any heart failure symptoms.  She is agreeable to EP consultation.  She mentions that she previously had stress at home with her daughter having anxiety issues.  Her daughter has now started on medication and is doing better and she feels less stressed about this. Patient reports no chest pain, shortness of breath, PND, orthopnea, presyncope, syncope, edema, heart racing, or palpitations.                         Assessment and Plan:       Plan  Patient Instructions   Medication Changes:  None     Recommendations:  Call with questions  Check daily weights and call the clinic if your weight has increased more than 2 lbs in one day or 5 lbs in one week; if you feel more short of breath or have worsening swelling in your legs or abdomen.    Follow-up:  Cardiology follow up at [facility]: EP consult.   Dr. Dennison in 4 months     Cardiology Scheduling~[phone]  Cardiology Clinic RN~[phone] (Lesa RN, Ann RN)               Other cardiomyopathy (H)  PVC's (premature ventricular contractions)  Cardiomyopathy, unspecified type (H)  Chest pain, unspecified type      PVC's (premature ventricular contractions)  Asymptomatic  Uptitrate medication as able  Consider EP consult if needed     Other cardiomyopathy (H)  No symptoms of heart failure   Continue CCB, did not tolerate beta-blocker  Reassess EF after PVC burden improved         Respiratory:  clear to auscultation; normal symmetry        Cardiac: regular rate and irregular rhythm     GI:  nondistended     Extremities and Muscular Skeletal:   no edema            Thank you for allowing me to participate in this delightful patient's care.      This note was completed in part using Dragon voice recognition software. Although reviewed after completion, some word and grammatical errors may occur.    Елена Geronimo, HOA CNP                 Thank you for allowing me to participate in the care of your patient.      Sincerely,     HOA Meng CNP     [facility]  cc:   HOA Snyder CNP  [address] [place] [address]

## 2023-11-15 NOTE — PATIENT INSTRUCTIONS
Medication Changes:  None     Recommendations:  Call with questions  Check daily weights and call the clinic if your weight has increased more than 2 lbs in one day or 5 lbs in one week; if you feel more short of breath or have worsening swelling in your legs or abdomen.    Follow-up:  Cardiology follow up at [facility]: EP consult.   Dr. Dennison in 4 months     Cardiology Scheduling~[phone]  Cardiology Clinic RN~[phone] (Lesa RN, Ann RN)

## 2024-01-09 ENCOUNTER — HOSPITAL ENCOUNTER (OUTPATIENT)
Dept: CARDIOLOGY | Facility: CLINIC | Age: 49
Discharge: HOME | End: 2024-01-09
Attending: INTERNAL MEDICINE | Admitting: INTERNAL MEDICINE
Payer: COMMERCIAL

## 2024-01-09 ENCOUNTER — VIRTUAL VISIT (OUTPATIENT)
Dept: CARDIOLOGY | Facility: CLINIC | Age: 49
End: 2024-01-09
Attending: NURSE PRACTITIONER
Payer: COMMERCIAL

## 2024-01-09 VITALS
WEIGHT: 155.6 LBS | DIASTOLIC BLOOD PRESSURE: 73 MMHG | BODY MASS INDEX: 27.57 KG/M2 | HEART RATE: 52 BPM | SYSTOLIC BLOOD PRESSURE: 115 MMHG | HEIGHT: 63 IN | OXYGEN SATURATION: 98 % | RESPIRATION RATE: 16 BRPM

## 2024-01-09 DIAGNOSIS — I42.8 OTHER CARDIOMYOPATHY (H): ICD-10-CM

## 2024-01-09 DIAGNOSIS — I49.3 PVC'S (PREMATURE VENTRICULAR CONTRACTIONS): ICD-10-CM

## 2024-01-09 PROCEDURE — 99203 OFFICE O/P NEW LOW 30 MIN: CPT | Mod: 95 | Performed by: INTERNAL MEDICINE

## 2024-01-09 PROCEDURE — 93225 XTRNL ECG REC<48 HRS REC: CPT

## 2024-01-09 PROCEDURE — 93227 XTRNL ECG REC<48 HR R&I: CPT | Performed by: INTERNAL MEDICINE

## 2024-01-09 NOTE — LETTER
1/9/2024    Camille Wilson MD  [facility] [address]  [facility] [address]    RE: Michael Marmolejo       Dear Colleague,     I had the pleasure of seeing Michael Marmolejo in the [facility].  A total of 30 minutes was spent with clinic visit, including chart review, including if available echo and cath images, and ECG, Holter, Event and coordinating care    General:  no apparent distress, normal body habitus, sitting upright.  ENT/Mouth:  membranes moist, no nasal discharge.  Normal head shape, no apparent injury or laceration.  Eyes:  no scleral icterus, normal conjunctivae.  No observed jaundice.  Neck:  no apparent neck swelling.   Chest/Lungs:  No breathing difficulty while speaking.  No audible wheezing.  No cough during conversation.  Cardiovascular:  No obviously elevated jugular venous pressure.  No apparent edema bilaterally in LE.   Abdomen:  no obvious abdominal distention.   Extremities:  no apparent cyanosis  Skin:  no xanthelasma.  No facial lacerations.  Neurologic:  Normal arm motion bilateral, no tremors.    Psychiatric:  Alert, calm demeanor    Delightful pt noted to have frequent monomorphic PVC (LBBB and inferior axis) when she had COVID last summer. No sxs such as palpitations, skipping or pounding beat. PVC burden up to 30% despite being on a bb which she could not tolerate. Able to tolerate current dilt past few months. No holter done yet while on it. MRI shows EF 50-55%. Normal stress test.     Appears to have asymptomatic RVOT PVCs. Unclear if LVEF estimate is accurate from MRI as if she truly has certain degree of LV dysfunction which could be from having too much PVCs then we need to be more aggressive with suppression with either Flecainide or ablation. Will obtain 24 h holter for now as current dilt may have been working. Will review cardiac MRI    Lang Hennessy MD       Thank you for allowing me to participate in the care of your  patient.      Sincerely,     Lang Arshad MD     [facility]  cc:   HOA Snyder CNP  [address] [place] [address]

## 2024-01-09 NOTE — PROGRESS NOTES
A total of 30 minutes was spent with clinic visit, including chart review, including if available echo and cath images, and ECG, Holter, Event and coordinating care    General:  no apparent distress, normal body habitus, sitting upright.  ENT/Mouth:  membranes moist, no nasal discharge.  Normal head shape, no apparent injury or laceration.  Eyes:  no scleral icterus, normal conjunctivae.  No observed jaundice.  Neck:  no apparent neck swelling.   Chest/Lungs:  No breathing difficulty while speaking.  No audible wheezing.  No cough during conversation.  Cardiovascular:  No obviously elevated jugular venous pressure.  No apparent edema bilaterally in LE.   Abdomen:  no obvious abdominal distention.   Extremities:  no apparent cyanosis  Skin:  no xanthelasma.  No facial lacerations.  Neurologic:  Normal arm motion bilateral, no tremors.    Psychiatric:  Alert, calm demeanor    Delightful pt noted to have frequent monomorphic PVC (LBBB and inferior axis) when she had COVID last summer. No sxs such as palpitations, skipping or pounding beat. PVC burden up to 30% despite being on a bb which she could not tolerate. Able to tolerate current dilt past few months. No holter done yet while on it. MRI shows EF 50-55%. Normal stress test.     Appears to have asymptomatic RVOT PVCs. Unclear if LVEF estimate is accurate from MRI as if she truly has certain degree of LV dysfunction which could be from having too much PVCs then we need to be more aggressive with suppression with either Flecainide or ablation. Will obtain 24 h holter for now as current dilt may have been working. Will review cardiac MRI    Lang Hennessy MD

## 2024-01-16 ENCOUNTER — TELEPHONE (OUTPATIENT)
Dept: CARDIOLOGY | Facility: CLINIC | Age: 49
End: 2024-01-16
Payer: COMMERCIAL

## 2024-01-16 DIAGNOSIS — I49.3 PVC'S (PREMATURE VENTRICULAR CONTRACTIONS): Primary | ICD-10-CM

## 2024-01-16 NOTE — TELEPHONE ENCOUNTER
01/16/24 Msg recd from Dr Hennessy    Conscious sedation only as general may suppress the pvc. No need to start flecainide. She can stop dilt now     Orders placed, message sent to scheduling to contact pt to set up H&P and PVC Ablation w 1 m follow up  Called pt/ and let them know to stop Diltiazem and no need to start Flecainide   Kenn 140 pm

## 2024-01-16 NOTE — TELEPHONE ENCOUNTER
"01/16/24 Msg recd from Dr Hennessy    Per Dr. Hennessy \"Please inform pt that pvc burden still high at 35%, unchanged from before despite being on dilt. Given MRI shows mild cardiomyopathy which likely from having too many pvcs I would consider either a trial of flecainide 100 mg bid or ablation. Either option we should stop diltiazem as it is not working. If on flecainide please obtain ecg in 3-5 days after starting it.\"       Spoke w pt and  who are interested in pursuing PVC Ablation. Answered questions to the best of my abilities . Asking whether they should stop Diltiazem and start Flecainide prior to Ablation or just continue Diltiazem  Routing to Dr Hennessy for update  [facility]    "

## 2024-01-18 ENCOUNTER — OFFICE VISIT (OUTPATIENT)
Dept: DERMATOLOGY | Facility: CLINIC | Age: 49
End: 2024-01-18
Payer: COMMERCIAL

## 2024-01-18 DIAGNOSIS — L28.1 PRURIGO NODULARIS: ICD-10-CM

## 2024-01-18 DIAGNOSIS — L20.9 ATOPIC DERMATITIS, UNSPECIFIED TYPE: Primary | ICD-10-CM

## 2024-01-18 DIAGNOSIS — L30.9 ECZEMA, UNSPECIFIED TYPE: ICD-10-CM

## 2024-01-18 PROCEDURE — 99203 OFFICE O/P NEW LOW 30 MIN: CPT | Performed by: PHYSICIAN ASSISTANT

## 2024-01-18 RX ORDER — CLOBETASOL PROPIONATE 0.5 MG/G
CREAM TOPICAL
Qty: 60 G | Refills: 5 | Status: SHIPPED | OUTPATIENT
Start: 2024-01-18

## 2024-01-18 RX ORDER — TACROLIMUS 1 MG/G
OINTMENT TOPICAL
Qty: 60 G | Refills: 5 | Status: SHIPPED | OUTPATIENT
Start: 2024-01-18

## 2024-01-18 NOTE — PATIENT INSTRUCTIONS
Prurigo nodularis  Use dove sensitive cleanser.   Use vanicream and cerave  cream once or twice daily.   Apply clobetasol cream twice daily to rash for two weeks then use on Saturday and Sunday.   Apply tacrolimus on Monday through Friday after first to weeks of clobetasol.    Recheck in 1-2 months.

## 2024-01-18 NOTE — LETTER
1/18/2024         RE: Michael Marmolejo  [address] [facility] [address]        Dear Colleague,    Thank you for referring your patient, Michael Marmolejo, to the [facility]. Please see a copy of my visit note below.    Michael Marmolejo is an extremely pleasant 48 year old year old female patient here today for rash on legs. She reports started last year. She reports extremely itchy, causes bleeding. She notes she is have difficulty sleeping due to itching. She was on diprosone and kenalog which hasn't helped much. She notes she also was given prednisone, claritin and benadryl in July but still itchy. She denies anything new as far as products. No new medications.   Patient has no other skin complaints today.  Remainder of the HPI, Meds, PMH, Allergies, FH, and SH was reviewed in chart.    History reviewed. No pertinent past medical history.    Past Surgical History:   Procedure Laterality Date     C/SECTION, LOW TRANSVERSE  07/01/2007        Family History   Problem Relation Age of Onset     Asthma Mother      Cancer Father         liver, ETOH abuse       Social History     Socioeconomic History     Marital status:      Spouse name: Not on file     Number of children: 1     Years of education: Not on file     Highest education level: Not on file   Occupational History     Employer: NONE    Tobacco Use     Smoking status: Never     Smokeless tobacco: Never   Vaping Use     Vaping Use: Never used   Substance and Sexual Activity     Alcohol use: Yes     Comment: rare     Drug use: No     Sexual activity: Yes     Partners: Male     Birth control/protection: Pill   Other Topics Concern     Parent/sibling w/ CABG, MI or angioplasty before 65F 55M? Not Asked   Social History Narrative     Not on file     Social Determinants of Health     Financial Resource Strain: Not on file   Food Insecurity: Not on file   Transportation Needs: Not on file   Physical Activity: Not on file   Stress:  Not on file   Social Connections: Not on file   Interpersonal Safety: Not on file   Housing Stability: Not on file       Outpatient Encounter Medications as of 1/18/2024   Medication Sig Dispense Refill     betamethasone dipropionate (DIPROSONE) 0.05 % external cream Apply topically 2 times daily 45 g 2     clobetasol (TEMOVATE) 0.05 % external cream Apply twice daily for two weeks to rash then after use on Saturday and Sunday. 60 g 5     tacrolimus (PROTOPIC) 0.1 % external ointment After two weeks of clobetasol use Monday through Friday. 60 g 5     Calcium Carbonate (CALCIUM 600 PO)        Cholecalciferol (D3 VITAMIN PO)        Multiple Vitamins-Minerals (MULTIVITAMIN GUMMIES ADULT PO)        norgestimate-ethinyl estradiol (ORTHO-CYCLEN) 0.25-35 MG-MCG tablet Take 1 tablet by mouth daily 84 tablet 3     triamcinolone (KENALOG) 0.1 % external cream Apply topically 2 times daily (Patient taking differently: Apply topically as needed) 80 g 3     No facility-administered encounter medications on file as of 1/18/2024.             O:   NAD, WDWN, Alert & Oriented, Mood & Affect wnl, Vitals stable   Here today with her     There were no vitals taken for this visit.   General appearance normal   Vitals stable   Alert, oriented and in no acute distress      Pink scaly excoriated scabbed papules all over both legs       Eyes: Conjunctivae/lids:Normal     ENT: Lips: normal    MSK:Normal    Pulm: Breathing Normal    Neuro/Psych: Orientation:Alert and Orientedx3 ; Mood/Affect:normal   A/P:  1. Prurigo nodularis with atopic dermatitis   Discussed skin condition.   Recommend moisturizing twice daily.   Use dove sensitive cleanser.   Use vanicream and cerave  cream once or twice daily.   Apply clobetasol cream twice daily to rash for two weeks then use on Saturday and Sunday.   Apply tacrolimus on Monday through Friday after first to weeks of clobetasol.  If not controlled discussed starting dupixent     Recheck in 1-2  months.       Again, thank you for allowing me to participate in the care of your patient.        Sincerely,        Alexsandra Fernandez PA-C

## 2024-01-19 NOTE — PROGRESS NOTES
Michael Marmolejo is an extremely pleasant 48 year old year old female patient here today for rash on legs. She reports started last year. She reports extremely itchy, causes bleeding. She notes she is have difficulty sleeping due to itching. She was on diprosone and kenalog which hasn't helped much. She notes she also was given prednisone, claritin and benadryl in July but still itchy. She denies anything new as far as products. No new medications.   Patient has no other skin complaints today.  Remainder of the HPI, Meds, PMH, Allergies, FH, and SH was reviewed in chart.    History reviewed. No pertinent past medical history.    Past Surgical History:   Procedure Laterality Date    C/SECTION, LOW TRANSVERSE  07/01/2007        Family History   Problem Relation Age of Onset    Asthma Mother     Cancer Father         liver, ETOH abuse       Social History     Socioeconomic History    Marital status:      Spouse name: Not on file    Number of children: 1    Years of education: Not on file    Highest education level: Not on file   Occupational History     Employer: NONE    Tobacco Use    Smoking status: Never    Smokeless tobacco: Never   Vaping Use    Vaping Use: Never used   Substance and Sexual Activity    Alcohol use: Yes     Comment: rare    Drug use: No    Sexual activity: Yes     Partners: Male     Birth control/protection: Pill   Other Topics Concern    Parent/sibling w/ CABG, MI or angioplasty before 65F 55M? Not Asked   Social History Narrative    Not on file     Social Determinants of Health     Financial Resource Strain: Not on file   Food Insecurity: Not on file   Transportation Needs: Not on file   Physical Activity: Not on file   Stress: Not on file   Social Connections: Not on file   Interpersonal Safety: Not on file   Housing Stability: Not on file       Outpatient Encounter Medications as of 1/18/2024   Medication Sig Dispense Refill    betamethasone dipropionate (DIPROSONE) 0.05 % external cream  Apply topically 2 times daily 45 g 2    clobetasol (TEMOVATE) 0.05 % external cream Apply twice daily for two weeks to rash then after use on Saturday and Sunday. 60 g 5    tacrolimus (PROTOPIC) 0.1 % external ointment After two weeks of clobetasol use Monday through Friday. 60 g 5    Calcium Carbonate (CALCIUM 600 PO)       Cholecalciferol (D3 VITAMIN PO)       Multiple Vitamins-Minerals (MULTIVITAMIN GUMMIES ADULT PO)       norgestimate-ethinyl estradiol (ORTHO-CYCLEN) 0.25-35 MG-MCG tablet Take 1 tablet by mouth daily 84 tablet 3    triamcinolone (KENALOG) 0.1 % external cream Apply topically 2 times daily (Patient taking differently: Apply topically as needed) 80 g 3     No facility-administered encounter medications on file as of 1/18/2024.             O:   NAD, WDWN, Alert & Oriented, Mood & Affect wnl, Vitals stable   Here today with her     There were no vitals taken for this visit.   General appearance normal   Vitals stable   Alert, oriented and in no acute distress      Pink scaly excoriated scabbed papules all over both legs       Eyes: Conjunctivae/lids:Normal     ENT: Lips: normal    MSK:Normal    Pulm: Breathing Normal    Neuro/Psych: Orientation:Alert and Orientedx3 ; Mood/Affect:normal   A/P:  1. Prurigo nodularis with atopic dermatitis   Discussed skin condition.   Recommend moisturizing twice daily.   Use dove sensitive cleanser.   Use vanicream and cerave  cream once or twice daily.   Apply clobetasol cream twice daily to rash for two weeks then use on Saturday and Sunday.   Apply tacrolimus on Monday through Friday after first to weeks of clobetasol.  If not controlled discussed starting dupixent     Recheck in 1-2 months.

## 2024-02-02 ENCOUNTER — VIRTUAL VISIT (OUTPATIENT)
Dept: CARDIOLOGY | Facility: CLINIC | Age: 49
End: 2024-02-02
Payer: COMMERCIAL

## 2024-02-02 DIAGNOSIS — I42.8 OTHER CARDIOMYOPATHY (H): Primary | ICD-10-CM

## 2024-02-02 DIAGNOSIS — I49.3 PVC'S (PREMATURE VENTRICULAR CONTRACTIONS): ICD-10-CM

## 2024-02-02 PROCEDURE — 99213 OFFICE O/P EST LOW 20 MIN: CPT | Mod: 95 | Performed by: NURSE PRACTITIONER

## 2024-02-02 NOTE — LETTER
"2/2/2024    Kim Fraser, APRN CNP  [address] [facility] [address]    RE: Michael Marmolejo       Dear Colleague,     I had the pleasure of seeing Michael Marmolejo in the [facility].  Martine is a 48 year old who is being evaluated via a billable video visit.      How would you like to obtain your AVS? MyChart  If the video visit is dropped, the invitation should be resent by:   Will anyone else be joining your video visit? No        Video-Visit Details    Type of service:  Video Visit   Video Start Time: 8:40 AM  Video End Time:8:55 AM    Originating Location (pt. Location): Home    Distant Location (provider location):  On-site  Platform used for Video Visit: [employer]    Vitals reported by patient are:  Vitals - Patient Reported  Weight (Patient Reported): 69.9 kg (154 lb)  Height (Patient Reported): 160 cm (5' 3\")  BMI (Based on Pt Reported Ht/Wt): 27.28      Review Of Systems  Skin: NEGATIVE  Eyes: NEGATIVE  Ears/Nose/Throat: NEGATIVE  Respiratory: NEGATIVE  Cardiovascular:NEGATIVE  Gastrointestinal: NEGATIVE  Genitourinary:NEGATIVE   Musculoskeletal: NEGATIVE  Neurologic: Minor headache and dizziness  Psychiatric: NEGATIVE  Hematologic/Lymphatic/Immunologic: NEGATIVE  Endocrine:  NEGATIVE    Telephone number of patient: [phone]     Cathy Rosales, EMT      RODERICK NOTE:  This visit was completed via video due to COVID-19 precautions.  The patient provided consent for a video visit.      I had the pleasure evaluating Michael Dye for PVC's.      The patient is a delightful 49 yo female with the following medical issues:  Frequent, asymptomatic PVCs, noted post COVID.  Decreased caffeine and does not drink alcohol.  Secondary cardiomyopathy EF 50 to 55% on cMRI      Is my pleasure meeting \"Aoy\" and her  on video visit today.  She is scheduled on 2/13/2024 with Dr. Hennessy for EPS and PVC ablation.  Patient has been healthy her whole life.  She had COVID last summer.  She was trialed on " metoprolol and diltiazem which she did not tolerate.  She had a normal stress test, and cardiac MRI shows EF of 50 to 55%.    She was seen in EP consultation and Dr. Hennessy felt she had asymptomatic RVOT PVCs.  Dr. Hennessy reviewed optimization of medication such as flecainide or ablation therapy.  Patient did not want to be committed to long-term medications and opted to proceed with ablation therapy.    Denies chest pain, orthopnea, PND, syncope, lower extremity edema.    PHYSICAL EXAMINATION:  General:  no apparent distress, normal body habitus, sitting upright.  ENT/Mouth:  no nasal discharge.  Normal head shape, no apparent injury or laceration.  Eyes:  normal conjunctivae.  No observed jaundice.  Neck:  no apparent neck swelling.   Chest/Lungs:  no breathing difficulty while speaking.  No audible wheezing.  No cough during conversation.  Cardiovascular:  reported HR is regular.  No obviously elevated jugular venous pressure.  No reported edema in LE.   Abdomen:  no apparent abdominal distention.   Extremities:  no apparent cyanosis.  Skin:  no xanthelasma or apparent rash.  Neurologic:  normal arm motion, no tremor.    Psychiatric:  alert and oriented x3, calm demeanor.  The rest of the comprehensive physical examination is deferred due to public health emergency video visit restrictions.         DIAGNOSTIC STUDIES:  MRI normal left ventricular size with mildly reduced systolic function. No late enhancement to suggest prior infarct, inflammation or infiltration.     Holter 1/9/2024: 24 hour monitor 35% PVC burden  27% burden Holter 8/2023- started beta-blocker  37% PVCs 10/2023-switched to diltiazem and increased  30% PVCs 11/2023  IMPRESSION:  Frequent, asymptomatic PVC, 35% burden  Secondary cardiomyopathy EF 50%      RECOMMENDATIONS:  EP study and PVC ablation was reviewed with the patient and her .  Risks and benefits were also discussed.  Patient is frustrated that she felt well until she had COVID and  believes that this is the reason she is having her PVCs.  Risks include but are not limited to; bruising, bleeding, vascular injury from catheter placement, pericarditis, and a rare incidence of pericardial effusion with possibility of tamponade, complete heart block, stroke, MI.  Patient understands and is willing to proceed.  She understands she will be awake for the procedure.  This should be a same-day procedure barring any complications.    Lifting restrictions was reviewed.  I will see her 1 month postprocedure or sooner with questions or concerns.    Thank you for including us in her care.    Mali Beard NP, APRN CNP    Thank you for allowing me to participate in the care of your patient.      Sincerely,     Mali Beard NP, APRN CNP     [facility]  cc:   Lang Arshad MD  [address]  [place] [address]

## 2024-02-02 NOTE — PROGRESS NOTES
"Martine is a 48 year old who is being evaluated via a billable video visit.      How would you like to obtain your AVS? MyChart  If the video visit is dropped, the invitation should be resent by:   Will anyone else be joining your video visit? No        Video-Visit Details    Type of service:  Video Visit   Video Start Time: 8:40 AM  Video End Time:8:55 AM    Originating Location (pt. Location): Home    Distant Location (provider location):  On-site  Platform used for Video Visit: [employer]    Vitals reported by patient are:  Vitals - Patient Reported  Weight (Patient Reported): 69.9 kg (154 lb)  Height (Patient Reported): 160 cm (5' 3\")  BMI (Based on Pt Reported Ht/Wt): 27.28      Review Of Systems  Skin: NEGATIVE  Eyes: NEGATIVE  Ears/Nose/Throat: NEGATIVE  Respiratory: NEGATIVE  Cardiovascular:NEGATIVE  Gastrointestinal: NEGATIVE  Genitourinary:NEGATIVE   Musculoskeletal: NEGATIVE  Neurologic: Minor headache and dizziness  Psychiatric: NEGATIVE  Hematologic/Lymphatic/Immunologic: NEGATIVE  Endocrine:  NEGATIVE    Telephone number of patient: [phone]     Cathy Rosales, EMT      RODERICK NOTE:  This visit was completed via video due to COVID-19 precautions.  The patient provided consent for a video visit.      I had the pleasure evaluating Michael Dye for PVC's.      The patient is a delightful 47 yo female with the following medical issues:  Frequent, asymptomatic PVCs, noted post COVID.  Decreased caffeine and does not drink alcohol.  Secondary cardiomyopathy EF 50 to 55% on cMRI      Is my pleasure meeting \"Martine\" and her  on video visit today.  She is scheduled on 2/13/2024 with Dr. Hennessy for EPS and PVC ablation.  Patient has been healthy her whole life.  She had COVID last summer.  She was trialed on metoprolol and diltiazem which she did not tolerate.  She had a normal stress test, and cardiac MRI shows EF of 50 to 55%.    She was seen in EP consultation and Dr. Hennessy felt she had asymptomatic RVOT PVCs.  " Dr. Hennessy reviewed optimization of medication such as flecainide or ablation therapy.  Patient did not want to be committed to long-term medications and opted to proceed with ablation therapy.    Denies chest pain, orthopnea, PND, syncope, lower extremity edema.    PHYSICAL EXAMINATION:  General:  no apparent distress, normal body habitus, sitting upright.  ENT/Mouth:  no nasal discharge.  Normal head shape, no apparent injury or laceration.  Eyes:  normal conjunctivae.  No observed jaundice.  Neck:  no apparent neck swelling.   Chest/Lungs:  no breathing difficulty while speaking.  No audible wheezing.  No cough during conversation.  Cardiovascular:  reported HR is regular.  No obviously elevated jugular venous pressure.  No reported edema in LE.   Abdomen:  no apparent abdominal distention.   Extremities:  no apparent cyanosis.  Skin:  no xanthelasma or apparent rash.  Neurologic:  normal arm motion, no tremor.    Psychiatric:  alert and oriented x3, calm demeanor.  The rest of the comprehensive physical examination is deferred due to public health emergency video visit restrictions.         DIAGNOSTIC STUDIES:  MRI normal left ventricular size with mildly reduced systolic function. No late enhancement to suggest prior infarct, inflammation or infiltration.     Holter 1/9/2024: 24 hour monitor 35% PVC burden  27% burden Holter 8/2023- started beta-blocker  37% PVCs 10/2023-switched to diltiazem and increased  30% PVCs 11/2023  IMPRESSION:  Frequent, asymptomatic PVC, 35% burden  Secondary cardiomyopathy EF 50%      RECOMMENDATIONS:  EP study and PVC ablation was reviewed with the patient and her .  Risks and benefits were also discussed.  Patient is frustrated that she felt well until she had COVID and believes that this is the reason she is having her PVCs.  Risks include but are not limited to; bruising, bleeding, vascular injury from catheter placement, pericarditis, and a rare incidence of pericardial  effusion with possibility of tamponade, complete heart block, stroke, MI.  Patient understands and is willing to proceed.  She understands she will be awake for the procedure.  This should be a same-day procedure barring any complications.    Lifting restrictions was reviewed.  I will see her 1 month postprocedure or sooner with questions or concerns.    Thank you for including us in her care.    Mali Beard NP, APRN CNP

## 2024-02-02 NOTE — PATIENT INSTRUCTIONS
2.34 Call my nurse with any questions or concerns:  [phone]  *If you have concerns after hours, please call [phone], option 2 to speak with on call Cardiologist.     Please call with questions or concerns  Nothing to eat or drink the morning of procedure

## 2024-02-07 ENCOUNTER — ALLIED HEALTH/NURSE VISIT (OUTPATIENT)
Dept: RESEARCH | Facility: CLINIC | Age: 49
End: 2024-02-07
Payer: COMMERCIAL

## 2024-02-07 DIAGNOSIS — Z00.6 PATIENT IN CLINICAL RESEARCH STUDY: Primary | ICD-10-CM

## 2024-02-07 NOTE — PROGRESS NOTES
Stress II  Oxidative Stress Markers in Heart Failure Blood Test for Diastolic Dysfunction     CONSENT NOTE:    IRB: [id number] PI: Genaro Brenner Jr., M.D., PhD - Phone: [phone]  : Vonnie Richmond, [facility] -Phone [phone], Sachi Majano, [facility] - Phone [phone]     Patient was approached for possible participation for the above study. Inclusion and exclusion criteria reviewed. Patient meets all of the inclusion criteria and does not meet any of the exclusion criteria. The current approved IRB consent form was discussed and explained to the patient.  It was discussed that involvement with the study is voluntary and refusal to participate would not involve penalty or decrease benefits at which the patient is entitled, and the subject may discontinue involvement at any time without penalty or loss in benefits. The consent form was reviewed including purpose, research hypothesis, nature of the research, risk & benefits. Also reviewed were confidentiality issues, compensation for injury, and alternative procedures available. The patient was given time to review and ask any questions about the consent. Patient was shown contact information for PI and study staff in consent for future questions. Patient verbalized understanding of consent and study by restating the purpose, procedures, duration, risk, confidentiality of PHI, and voluntarily participation. Questions and concerns were addressed. Patient printed, signed and dated the consent/HIPAA form prior to study involvement. A copy was given to the patient for their records.       Subject Consent/HIPAA: SIGNED ON 07 Feb 2024    NAME Vonnie Richmond, [facility]

## 2024-02-12 ENCOUNTER — TELEPHONE (OUTPATIENT)
Dept: CARDIOLOGY | Facility: CLINIC | Age: 49
End: 2024-02-12
Payer: COMMERCIAL

## 2024-02-12 DIAGNOSIS — I49.3 PVC'S (PREMATURE VENTRICULAR CONTRACTIONS): Primary | ICD-10-CM

## 2024-02-12 RX ORDER — LIDOCAINE 40 MG/G
CREAM TOPICAL
Status: CANCELLED | OUTPATIENT
Start: 2024-02-12

## 2024-02-12 RX ORDER — SODIUM CHLORIDE, SODIUM LACTATE, POTASSIUM CHLORIDE, CALCIUM CHLORIDE 600; 310; 30; 20 MG/100ML; MG/100ML; MG/100ML; MG/100ML
INJECTION, SOLUTION INTRAVENOUS CONTINUOUS
Status: CANCELLED | OUTPATIENT
Start: 2024-02-12

## 2024-02-12 NOTE — TELEPHONE ENCOUNTER
Spoke to patient to review instructions for PVC ablation scheduled for 2/13.  Patient aware that she is to be NPO after 3:30am and may take sips of water with am medications. Ok to have clear liquids until 7:30am.  Patient to arrive at 9:30am.  Patient aware that she will NOT be staying overnight after procedure unless there are complications.  Patient has arranged for ride and someone to be with her for the first 24 hours.  Patient to check temp the morning of procedure and call if temp >100.  Patient to call Care Suites at [phone] to alert them so procedure can be cancelled.  Patient provided verbal understanding regarding above.   FLAQUITA Rivera

## 2024-02-12 NOTE — TELEPHONE ENCOUNTER
Left message for patient to review instructions for scheduled PVC ablation.  Waiting call back from patient.  Sent my chart message as well asking patient to call.  FLAQUITA Rivera

## 2024-02-13 ENCOUNTER — HOSPITAL ENCOUNTER (OUTPATIENT)
Facility: CLINIC | Age: 49
Discharge: HOME | End: 2024-02-13
Admitting: INTERNAL MEDICINE
Payer: COMMERCIAL

## 2024-02-13 VITALS
HEART RATE: 60 BPM | DIASTOLIC BLOOD PRESSURE: 67 MMHG | SYSTOLIC BLOOD PRESSURE: 116 MMHG | TEMPERATURE: 97.6 F | HEIGHT: 63 IN | OXYGEN SATURATION: 98 % | RESPIRATION RATE: 16 BRPM | BODY MASS INDEX: 27.64 KG/M2 | WEIGHT: 156 LBS

## 2024-02-13 DIAGNOSIS — I49.3 PVC'S (PREMATURE VENTRICULAR CONTRACTIONS): ICD-10-CM

## 2024-02-13 DIAGNOSIS — R21 RASH: ICD-10-CM

## 2024-02-13 LAB
ANION GAP SERPL CALCULATED.3IONS-SCNC: 8 MMOL/L (ref 7–15)
BUN SERPL-MCNC: 9.8 MG/DL (ref 6–20)
CALCIUM SERPL-MCNC: 9.1 MG/DL (ref 8.6–10)
CHLORIDE SERPL-SCNC: 105 MMOL/L (ref 98–107)
CREAT SERPL-MCNC: 0.69 MG/DL (ref 0.51–0.95)
DEPRECATED HCO3 PLAS-SCNC: 27 MMOL/L (ref 22–29)
EGFRCR SERPLBLD CKD-EPI 2021: >90 ML/MIN/1.73M2
ERYTHROCYTE [DISTWIDTH] IN BLOOD BY AUTOMATED COUNT: 14.6 % (ref 10–15)
GLUCOSE SERPL-MCNC: 88 MG/DL (ref 70–99)
HCG INTACT+B SERPL-ACNC: <1 MIU/ML
HCT VFR BLD AUTO: 35.8 % (ref 35–47)
HGB BLD-MCNC: 11.8 G/DL (ref 11.7–15.7)
MCH RBC QN AUTO: 24.3 PG (ref 26.5–33)
MCHC RBC AUTO-ENTMCNC: 33 G/DL (ref 31.5–36.5)
MCV RBC AUTO: 74 FL (ref 78–100)
PLATELET # BLD AUTO: 196 10E3/UL (ref 150–450)
POTASSIUM SERPL-SCNC: 3.9 MMOL/L (ref 3.4–5.3)
RBC # BLD AUTO: 4.85 10E6/UL (ref 3.8–5.2)
SODIUM SERPL-SCNC: 140 MMOL/L (ref 135–145)
WBC # BLD AUTO: 7.6 10E3/UL (ref 4–11)

## 2024-02-13 PROCEDURE — 99153 MOD SED SAME PHYS/QHP EA: CPT | Performed by: INTERNAL MEDICINE

## 2024-02-13 PROCEDURE — 99152 MOD SED SAME PHYS/QHP 5/>YRS: CPT | Performed by: INTERNAL MEDICINE

## 2024-02-13 PROCEDURE — 84702 CHORIONIC GONADOTROPIN TEST: CPT | Performed by: INTERNAL MEDICINE

## 2024-02-13 PROCEDURE — C1732 CATH, EP, DIAG/ABL, 3D/VECT: HCPCS | Performed by: INTERNAL MEDICINE

## 2024-02-13 PROCEDURE — 82374 ASSAY BLOOD CARBON DIOXIDE: CPT | Performed by: INTERNAL MEDICINE

## 2024-02-13 PROCEDURE — 999N000184 HC STATISTIC TELEMETRY

## 2024-02-13 PROCEDURE — 999N000054 HC STATISTIC EKG NON-CHARGEABLE

## 2024-02-13 PROCEDURE — C1887 CATHETER, GUIDING: HCPCS | Performed by: INTERNAL MEDICINE

## 2024-02-13 PROCEDURE — 272N000001 HC OR GENERAL SUPPLY STERILE: Performed by: INTERNAL MEDICINE

## 2024-02-13 PROCEDURE — 250N000009 HC RX 250: Performed by: INTERNAL MEDICINE

## 2024-02-13 PROCEDURE — 93654 COMPRE EP EVAL TX VT: CPT | Performed by: INTERNAL MEDICINE

## 2024-02-13 PROCEDURE — 250N000011 HC RX IP 250 OP 636: Performed by: INTERNAL MEDICINE

## 2024-02-13 PROCEDURE — 93005 ELECTROCARDIOGRAM TRACING: CPT

## 2024-02-13 PROCEDURE — 36415 COLL VENOUS BLD VENIPUNCTURE: CPT | Performed by: INTERNAL MEDICINE

## 2024-02-13 PROCEDURE — C1730 CATH, EP, 19 OR FEW ELECT: HCPCS | Performed by: INTERNAL MEDICINE

## 2024-02-13 PROCEDURE — C1760 CLOSURE DEV, VASC: HCPCS | Performed by: INTERNAL MEDICINE

## 2024-02-13 PROCEDURE — C1733 CATH, EP, OTHR THAN COOL-TIP: HCPCS | Performed by: INTERNAL MEDICINE

## 2024-02-13 PROCEDURE — 999N000071 HC STATISTIC HEART CATH LAB OR EP LAB

## 2024-02-13 PROCEDURE — 85014 HEMATOCRIT: CPT | Performed by: INTERNAL MEDICINE

## 2024-02-13 PROCEDURE — 258N000003 HC RX IP 258 OP 636: Performed by: INTERNAL MEDICINE

## 2024-02-13 RX ORDER — NALOXONE HYDROCHLORIDE 0.4 MG/ML
0.4 INJECTION, SOLUTION INTRAMUSCULAR; INTRAVENOUS; SUBCUTANEOUS
Status: DISCONTINUED | OUTPATIENT
Start: 2024-02-13 | End: 2024-02-13 | Stop reason: HOSPADM

## 2024-02-13 RX ORDER — NALOXONE HYDROCHLORIDE 0.4 MG/ML
0.2 INJECTION, SOLUTION INTRAMUSCULAR; INTRAVENOUS; SUBCUTANEOUS
Status: DISCONTINUED | OUTPATIENT
Start: 2024-02-13 | End: 2024-02-13 | Stop reason: HOSPADM

## 2024-02-13 RX ORDER — SODIUM CHLORIDE, SODIUM LACTATE, POTASSIUM CHLORIDE, CALCIUM CHLORIDE 600; 310; 30; 20 MG/100ML; MG/100ML; MG/100ML; MG/100ML
INJECTION, SOLUTION INTRAVENOUS CONTINUOUS
Status: DISCONTINUED | OUTPATIENT
Start: 2024-02-13 | End: 2024-02-13 | Stop reason: HOSPADM

## 2024-02-13 RX ORDER — FENTANYL CITRATE 50 UG/ML
INJECTION, SOLUTION INTRAMUSCULAR; INTRAVENOUS
Status: DISCONTINUED | OUTPATIENT
Start: 2024-02-13 | End: 2024-02-13 | Stop reason: HOSPADM

## 2024-02-13 RX ORDER — LIDOCAINE 40 MG/G
CREAM TOPICAL
Status: DISCONTINUED | OUTPATIENT
Start: 2024-02-13 | End: 2024-02-13 | Stop reason: HOSPADM

## 2024-02-13 RX ORDER — OXYCODONE AND ACETAMINOPHEN 5; 325 MG/1; MG/1
1 TABLET ORAL EVERY 4 HOURS PRN
Status: DISCONTINUED | OUTPATIENT
Start: 2024-02-13 | End: 2024-02-13 | Stop reason: HOSPADM

## 2024-02-13 RX ADMIN — SODIUM CHLORIDE, POTASSIUM CHLORIDE, SODIUM LACTATE AND CALCIUM CHLORIDE: 600; 310; 30; 20 INJECTION, SOLUTION INTRAVENOUS at 09:23

## 2024-02-13 ASSESSMENT — ACTIVITIES OF DAILY LIVING (ADL)
ADLS_ACUITY_SCORE: 35

## 2024-02-13 NOTE — Clinical Note
Goal Outcome Evaluation:  Infant did not show any signs of oral readiness.  No respiratory concerns.  Nystatin continued for oral thrush.  Diaper rash looks improved.  Voiding and stooling.                          Sheath prepped and inserted in the right femoral vein.

## 2024-02-13 NOTE — PROGRESS NOTES
Care Suites Admission Nursing Note    Patient Information  Name: Michael Marmolejo  Age: 48 year old  Reason for admission: ablation   Care [facility]es arrival time: 0900    Visitor Information  Name: yuly      Patient Admission/Assessment   Pre-procedure assessment complete: Yes  If abnormal assessment/labs, provider notified: N/A  NPO: Yes  Medications held per instructions/orders: Yes  Consent: deferred  If applicable, pregnancy test status: obtained  Patient oriented to room: Yes  Education/questions answered: Yes  Plan/other: ablation     Discharge Planning  Discharge name/phone number: yuly [phone]  Overnight post sedation caregiver: yuly   Discharge location: home    Rebeca Velasquez RN

## 2024-02-13 NOTE — PROGRESS NOTES
[facility] Post Procedure Note    Patient Information  Name: Michael Marmolejo  Age: 48 year old    Post Procedure  Time patient returned to Care Suites: 1150  Concerns/abnormal assessment: no  If abnormal assessment, provider notified: Yes  Plan/Other: Recover in care suites- plan for discharge after bedrest complete.  Monitor.    Marci Suarez RN

## 2024-02-13 NOTE — PROGRESS NOTES
Care [facility]es Discharge Nursing Note    Patient Information  Name: Michael Marmolejo  Age: 48 year old    Discharge Education:  Discharge instructions reviewed: Yes  Additional education/resources provided: no  Patient/patient representative verbalizes understanding: Yes  Patient discharging on new medications: No  Medication education completed: N/A    Discharge Plans:   Discharge location: home  Discharge ride contacted: Yes  Approximate discharge time: 1415    Discharge Criteria:  Discharge criteria met and vital signs stable: Yes    Patient Belongs:  Patient belongings returned to patient: Yes    Rebeca Velasquez RN

## 2024-02-13 NOTE — DISCHARGE INSTRUCTIONS
PVC Ablation Discharge Instructions - Femoral     After you go home:    Have an adult stay with you until tomorrow.  You may resume your normal diet.       For 24 hours - due to the sedation you received:  Relax and take it easy.  Do NOT make any important or legal decisions.  Do NOT drive or operate machines at home or at work.  Do NOT drink alcohol.    Care of Groin Puncture Site:    For the first 24 hrs - check the puncture site every 1-2 hours while awake.  For 2 days, when you cough, sneeze, laugh or move your bowels, hold your hand over the puncture site and press firmly.  Remove the bandaid after 24 hours. If there is minor oozing, apply another bandaid and remove it after 12 hours.  It is normal to have a small bruise or pea size lump at the site.  You may shower tomorrow.  Do NOT take a bath, or use a hot tub or pool for at least 3 days. Do NOT scrub the site. Do NOT use lotion or powder near the puncture site.    Activity:            For 3 days:  No stooping or squatting  Do NOT do any heavy activity such as exercise, lifting, or straining.   No housework, yard work or any activity that make you sweat  Do NOT lift more than 10 pounds  Limit going up and down the stairs repetitively, for the first 24 hours after procedure.     Bleeding:    If you start bleeding from the site in your groin, lie down flat and press firmly on the site for 10 minutes.   Once bleeding stops, lay flat for 2 hours.  Call [facility] as soon as you can.       Call 911 right away if you have heavy bleeding or bleeding that does not stop.      Medicines:    Take your medications, including blood thinners, unless your provider tells you not to.  If you have stopped any medicines, check with your provider about when to restart them.  If you have pain or shortness of breath, you may take Advil (ibuprofen) or Tylenol (acetaminophen).    Follow Up Appointments:    3/13/2024 with Mali Beard, RODERICK at 1:50pm in [place]  You will get a  call from FLAQUITA Mcfadden on 2/14/2024 to see how you are doing.    Call the clinic if:    You have increased pain or a large or growing hard lump around the site.  The site is red, swollen, hot or tender.  Blood or fluid is draining from the site.  You have chills or a fever greater than 101 F (38 C).  Your leg feels numb, cool or changes color.  Increased pain in the chest and/or groin.  Increased shortness of breath  Chest pain not relieved by Tylenol or Advil  New pain in the back or belly that you cannot control with Tylenol.  Recurrent irregular or fast heart rate lasting over 2 hours.  Any questions or concerns.    Heart rhythms:    You may have some irregular heartbeats. These feel very strong. They may make you feel that the fast heart rhythm is going to start again.  Give it time. The irregular beats should occur less often.       [facility]:    [phone] ( 8am-5pm M-F)  Nelly Canela or Lesa    On call Cardiologist [phone] (Option 2) (7 days a week)

## 2024-02-14 ENCOUNTER — TELEPHONE (OUTPATIENT)
Dept: CARDIOLOGY | Facility: CLINIC | Age: 49
End: 2024-02-14
Payer: COMMERCIAL

## 2024-02-14 DIAGNOSIS — I49.3 PVC'S (PREMATURE VENTRICULAR CONTRACTIONS): Primary | ICD-10-CM

## 2024-02-14 NOTE — TELEPHONE ENCOUNTER
02/14/24 Pt/ returned call    Called pt in follow up to PVC Ablation yesterday     Reviewed discharge instructions with patient    Pain level is 0    Groin dressing has been removed and pt showered. Area is C/D/I     Breathing feels comfortable     Pt is eating, drinking and urinating without difficulty .     Reviewed activity instructions    Reviewed any medication changes - none     Order placed for 24 hr Holter to be placed in 1-2 weeks per verbal order from Dr Hennessy. [place] [facility] [place]     Discussed reasons to call [facility] RN    Reminded of follow up appointments    Verified pt has [facility] RN and after hours Cardiology phone numbers    Pt voiced understanding and has no further questions/concerns at this time.    Kenn  3 pm

## 2024-02-16 LAB
ATRIAL RATE - MUSE: 75 BPM
DIASTOLIC BLOOD PRESSURE - MUSE: NORMAL MMHG
INTERPRETATION ECG - MUSE: NORMAL
P AXIS - MUSE: 66 DEGREES
PR INTERVAL - MUSE: 152 MS
QRS DURATION - MUSE: 88 MS
QT - MUSE: 416 MS
QTC - MUSE: 464 MS
R AXIS - MUSE: -2 DEGREES
SYSTOLIC BLOOD PRESSURE - MUSE: NORMAL MMHG
T AXIS - MUSE: 80 DEGREES
VENTRICULAR RATE- MUSE: 75 BPM

## 2024-02-22 ENCOUNTER — HOSPITAL ENCOUNTER (OUTPATIENT)
Dept: CARDIOLOGY | Facility: CLINIC | Age: 49
Discharge: HOME | End: 2024-02-22
Attending: INTERNAL MEDICINE | Admitting: INTERNAL MEDICINE
Payer: COMMERCIAL

## 2024-02-22 DIAGNOSIS — I49.3 PVC'S (PREMATURE VENTRICULAR CONTRACTIONS): ICD-10-CM

## 2024-02-22 PROCEDURE — 93225 XTRNL ECG REC<48 HRS REC: CPT

## 2024-02-22 PROCEDURE — 93227 XTRNL ECG REC<48 HR R&I: CPT | Performed by: INTERNAL MEDICINE

## 2024-02-27 DIAGNOSIS — I42.9 CARDIOMYOPATHY, UNSPECIFIED TYPE (H): ICD-10-CM

## 2024-02-27 DIAGNOSIS — I49.3 PVC'S (PREMATURE VENTRICULAR CONTRACTIONS): Primary | ICD-10-CM

## 2024-03-07 ENCOUNTER — OFFICE VISIT (OUTPATIENT)
Dept: DERMATOLOGY | Facility: CLINIC | Age: 49
End: 2024-03-07
Payer: COMMERCIAL

## 2024-03-07 DIAGNOSIS — L20.9 ATOPIC DERMATITIS, UNSPECIFIED TYPE: ICD-10-CM

## 2024-03-07 DIAGNOSIS — L28.1 PRURIGO NODULARIS: Primary | ICD-10-CM

## 2024-03-07 PROCEDURE — 99213 OFFICE O/P EST LOW 20 MIN: CPT | Performed by: PHYSICIAN ASSISTANT

## 2024-03-07 ASSESSMENT — PAIN SCALES - GENERAL: PAINLEVEL: NO PAIN (0)

## 2024-03-07 NOTE — PROGRESS NOTES
Michael Marmolejo is an extremely pleasant 49 year old year old female patient here today for rash on legs. She notes it has improved after alternating between clobetasol and protopic. She notes healing slowly, sometimes still itchy.  Patient has no other skin complaints today.  Remainder of the HPI, Meds, PMH, Allergies, FH, and SH was reviewed in chart.    No past medical history on file.    Past Surgical History:   Procedure Laterality Date    C/SECTION, LOW TRANSVERSE  07/01/2007    EP ABLATION PVC N/A 2/13/2024    Procedure: Ablation Premature Ventricular Contractions;  Surgeon: Lang Hennessy MD;  Location:  HEART CARDIAC CATH LAB        Family History   Problem Relation Age of Onset    Asthma Mother     Cancer Father         liver, ETOH abuse       Social History     Socioeconomic History    Marital status:      Spouse name: Not on file    Number of children: 1    Years of education: Not on file    Highest education level: Not on file   Occupational History     Employer: NONE    Tobacco Use    Smoking status: Never    Smokeless tobacco: Never   Vaping Use    Vaping Use: Never used   Substance and Sexual Activity    Alcohol use: Yes     Comment: rare    Drug use: No    Sexual activity: Yes     Partners: Male     Birth control/protection: Pill   Other Topics Concern    Parent/sibling w/ CABG, MI or angioplasty before 65F 55M? Not Asked   Social History Narrative    Not on file     Social Determinants of Health     Financial Resource Strain: Not on file   Food Insecurity: Not on file   Transportation Needs: Not on file   Physical Activity: Not on file   Stress: Not on file   Social Connections: Not on file   Interpersonal Safety: Not on file   Housing Stability: Not on file       Outpatient Encounter Medications as of 3/7/2024   Medication Sig Dispense Refill    betamethasone dipropionate (DIPROSONE) 0.05 % external cream Apply topically 2 times daily 45 g 2    Calcium Carbonate (CALCIUM 600 PO)        Cholecalciferol (D3 VITAMIN PO)       clobetasol (TEMOVATE) 0.05 % external cream Apply twice daily for two weeks to rash then after use on Saturday and Sunday. 60 g 5    Multiple Vitamins-Minerals (MULTIVITAMIN GUMMIES ADULT PO)       norgestimate-ethinyl estradiol (ORTHO-CYCLEN) 0.25-35 MG-MCG tablet Take 1 tablet by mouth daily 84 tablet 3    tacrolimus (PROTOPIC) 0.1 % external ointment After two weeks of clobetasol use Monday through Friday. 60 g 5    triamcinolone (KENALOG) 0.1 % external cream Apply topically 2 times daily (Patient taking differently: Apply topically as needed) 80 g 3     No facility-administered encounter medications on file as of 3/7/2024.             O:   NAD, WDWN, Alert & Oriented, Mood & Affect wnl, Vitals stable   Here today with her     There were no vitals taken for this visit.   General appearance normal   Vitals stable   Alert, oriented and in no acute distress      Few Pink scaly excoriated scabbed papules all over both legs    PIH on legs       Eyes: Conjunctivae/lids:Normal     ENT: Lips: normal    MSK:Normal    Pulm: Breathing Normal    Neuro/Psych: Orientation:Alert and Orientedx3 ; Mood/Affect:normal   A/P:  1. Prurigo nodularis with atopic dermatitis   Discussed skin condition. 50% improved.   Recommend moisturizing twice daily.   Use dove sensitive cleanser.   Use vanicream and cerave  cream once or twice daily.    Apply tacrolimus on Monday through Friday and clobetasol on weekends.   Recheck in 3 months.

## 2024-03-07 NOTE — LETTER
3/7/2024         RE: Michael Marmolejo  [id number] [facility] [id number]        Dear Colleague,    Thank you for referring your patient, Michael Marmolejo, to the [facility]. Please see a copy of my visit note below.    Michael Marmolejo is an extremely pleasant 49 year old year old female patient here today for rash on legs. She notes it has improved after alternating between clobetasol and protopic. She notes healing slowly, sometimes still itchy.  Patient has no other skin complaints today.  Remainder of the HPI, Meds, PMH, Allergies, FH, and SH was reviewed in chart.    No past medical history on file.    Past Surgical History:   Procedure Laterality Date     C/SECTION, LOW TRANSVERSE  07/01/2007     EP ABLATION PVC N/A 2/13/2024    Procedure: Ablation Premature Ventricular Contractions;  Surgeon: Lang Hennessy MD;  Location:  HE[facility]ARD[facility] CATH LAB        Family History   Problem Relation Age of Onset     Asthma Mother      Cancer Father         liver, ETOH abuse       Social History     Socioeconomic History     Marital status:      Spouse name: Not on file     Number of children: 1     Years of education: Not on file     Highest education level: Not on file   Occupational History     Employer: NONE    Tobacco Use     Smoking status: Never     Smokeless tobacco: Never   Vaping Use     Vaping Use: Never used   Substance and Sexual Activity     Alcohol use: Yes     Comment: rare     Drug use: No     Sexual activity: Yes     Partners: Male     Birth control/protection: Pill   Other Topics Concern     Parent/sibling w/ CABG, MI or angioplasty before 65F 55M? Not Asked   Social History Narrative     Not on file     Social Determinants of Health     Financial Resource Strain: Not on file   Food Insecurity: Not on file   Transportation Needs: Not on file   Physical Activity: Not on file   Stress: Not on file   Social Connections: Not on file   Interpersonal Safety: Not on file    Housing Stability: Not on file       Outpatient Encounter Medications as of 3/7/2024   Medication Sig Dispense Refill     betamethasone dipropionate (DIPROSONE) 0.05 % external cream Apply topically 2 times daily 45 g 2     Calcium Carbonate (CALCIUM 600 PO)        Cholecalciferol (D3 VITAMIN PO)        clobetasol (TEMOVATE) 0.05 % external cream Apply twice daily for two weeks to rash then after use on Saturday and Sunday. 60 g 5     Multiple Vitamins-Minerals (MULTIVITAMIN GUMMIES ADULT PO)        norgestimate-ethinyl estradiol (ORTHO-CYCLEN) 0.25-35 MG-MCG tablet Take 1 tablet by mouth daily 84 tablet 3     tacrolimus (PROTOPIC) 0.1 % external ointment After two weeks of clobetasol use Monday through Friday. 60 g 5     triamcinolone (KENALOG) 0.1 % external cream Apply topically 2 times daily (Patient taking differently: Apply topically as needed) 80 g 3     No facility-administered encounter medications on file as of 3/7/2024.             O:   NAD, WDWN, Alert & Oriented, Mood & Affect wnl, Vitals stable   Here today with her     There were no vitals taken for this visit.   General appearance normal   Vitals stable   Alert, oriented and in no acute distress      Few Pink scaly excoriated scabbed papules all over both legs    PIH on legs       Eyes: Conjunctivae/lids:Normal     ENT: Lips: normal    MSK:Normal    Pulm: Breathing Normal    Neuro/Psych: Orientation:Alert and Orientedx3 ; Mood/Affect:normal   A/P:  1. Prurigo nodularis with atopic dermatitis   Discussed skin condition. 50% improved.   Recommend moisturizing twice daily.   Use dove sensitive cleanser.   Use vanicream and cerave  cream once or twice daily.    Apply tacrolimus on Monday through Friday and clobetasol on weekends.   Recheck in 3 months.       Again, thank you for allowing me to participate in the care of your patient.        Sincerely,        Alexsandra Fernandez PA-C

## 2024-03-13 ENCOUNTER — OFFICE VISIT (OUTPATIENT)
Dept: CARDIOLOGY | Facility: CLINIC | Age: 49
End: 2024-03-13
Payer: COMMERCIAL

## 2024-03-13 VITALS
WEIGHT: 158 LBS | HEART RATE: 86 BPM | SYSTOLIC BLOOD PRESSURE: 126 MMHG | HEIGHT: 63 IN | BODY MASS INDEX: 28 KG/M2 | OXYGEN SATURATION: 97 % | DIASTOLIC BLOOD PRESSURE: 85 MMHG

## 2024-03-13 DIAGNOSIS — I42.8 OTHER CARDIOMYOPATHY (H): ICD-10-CM

## 2024-03-13 DIAGNOSIS — I49.3 PVC'S (PREMATURE VENTRICULAR CONTRACTIONS): Primary | ICD-10-CM

## 2024-03-13 PROCEDURE — 99212 OFFICE O/P EST SF 10 MIN: CPT | Performed by: NURSE PRACTITIONER

## 2024-03-13 PROCEDURE — 93000 ELECTROCARDIOGRAM COMPLETE: CPT | Performed by: NURSE PRACTITIONER

## 2024-03-13 NOTE — PROGRESS NOTES
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  PVC burden 37% on AV blocking agents.  Successful RVOT ablation 2/13/2024 with Dr. Hennessy.  Secondary cardiomyopathy EF 50 to 55% on cMRI    It is my pleasure seeing \"Aosusu\" and her  today in follow-up at her [place] location.  She has a history of high PVC burden.  Initially thought she was not symptomatic, but since she had her RVOT PVC ablation she is surprised at how much her energy level has improved.  Now walking up to 2 miles a day without symptoms of shortness of breath or fatigue.  Patient states right femoral access is well-healed.  Denies chest pain, orthopnea, PND, syncope, lower extremity edema.  She is not on any AV blocking agents.        Diagnotic studies:  Twelve-lead EKG today shows sinus rhythm.  No PVCs noted  24-hour Holter monitor 2/22/2024 showed sinus rhythm with an average heart rate of 80 bpm, max 117 bpm minimum of 60 bpm.  No pauses and 1 isolated PAC noted.  No PVCs noted.  Echocardiogram 8/2023: EF 50 to 55%.  No valvular abnormalities noted.         Plan:   1. frequent PVCs  Successful RVOT ablation  Showed patient her follow-up EKG compared to her previous which showed bigeminal PVCs.    Holter monitor reviewed which showed no PVCs.  Limited echo scheduled for next week to reassess LV function post ablation.    2. mild secondary cardiomyopathy  Thought to be secondary to elevated PVC burden.    Limited echo scheduled next week    Patient is scheduled to see Dr. Dennison at the end of the week.  I did recommend trying to push the appointment out 1 to 2 months.  She would like to see her for general cardiology.    She will see EP as needed.  I appreciate being involved in her " care.  Please feel free to contact us if you have questions or concerns regarding today's assessment and plan.    Mali Beard, NP, APRN CNP      Today's clinic visit entailed:  Review of the result(s) of each unique test - EKG and Holter  15  minutes spent by me on the date of the encounter doing chart review, review of test results, patient visit, documentation, and discussion with family   Provider  Link to [facility] Help Grid     The level of medical decision making during this visit was of moderate complexity.      Orders Placed This Encounter   Procedures    EKG 12-lead complete w/read - Clinics (performed today)     No orders of the defined types were placed in this encounter.    There are no discontinued medications.      Encounter Diagnosis   Name Primary?    PVC's (premature ventricular contractions)        CURRENT MEDICATIONS:  Current Outpatient Medications   Medication Sig Dispense Refill    Calcium Carbonate (CALCIUM 600 PO)       Cholecalciferol (D3 VITAMIN PO)       clobetasol (TEMOVATE) 0.05 % external cream Apply twice daily for two weeks to rash then after use on Saturday and Sunday. 60 g 5    Multiple Vitamins-Minerals (MULTIVITAMIN GUMMIES ADULT PO)       norgestimate-ethinyl estradiol (ORTHO-CYCLEN) 0.25-35 MG-MCG tablet Take 1 tablet by mouth daily 84 tablet 3    tacrolimus (PROTOPIC) 0.1 % external ointment After two weeks of clobetasol use Monday through Friday. 60 g 5    betamethasone dipropionate (DIPROSONE) 0.05 % external cream Apply topically 2 times daily (Patient not taking: Reported on 3/13/2024) 45 g 2    triamcinolone (KENALOG) 0.1 % external cream Apply topically 2 times daily (Patient not taking: Reported on 3/13/2024) 80 g 3       ALLERGIES     Allergies   Allergen Reactions    Metoprolol Muscle Pain (Myalgia) and Fatigue       PAST MEDICAL HISTORY:  History reviewed. No pertinent past medical history.    PAST SURGICAL HISTORY:  Past Surgical History:   Procedure Laterality Date     "C/SECTION, LOW TRANSVERSE  07/01/2007    EP ABLATION PVC N/A 2/13/2024    Procedure: Ablation Premature Ventricular Contractions;  Surgeon: Lang Hennessy MD;  Location:  [facility]ARD[facility] LAB       FAMILY HISTORY:  Family History   Problem Relation Age of Onset    Asthma Mother     Cancer Father         liver, ETOH abuse       SOCIAL HISTORY:  Social History     Socioeconomic History    Marital status:      Spouse name: None    Number of children: 1    Years of education: None    Highest education level: None   Occupational History     Employer: NONE    Tobacco Use    Smoking status: Never    Smokeless tobacco: Never   Vaping Use    Vaping Use: Never used   Substance and Sexual Activity    Alcohol use: Yes     Comment: rare    Drug use: No    Sexual activity: Yes     Partners: Male     Birth control/protection: Pill       Review of Systems:  Skin:        Eyes:       ENT:       Respiratory:  Negative    Cardiovascular:  Negative    Gastroenterology:      Genitourinary:       Musculoskeletal:       Neurologic:       Psychiatric:       Heme/Lymph/Imm:       Endocrine:  Negative thyroid disorder;diabetes    Physical Exam:    Vitals: /85   Pulse 86   Ht 1.6 m (5' 3\")   Wt 71.7 kg (158 lb)   SpO2 97%   BMI 27.99 kg/m    Constitutional: Well nourished and in no apparent distress.  Eyes: Pupils equal, round. Sclerae anicteric.   HEENT: Normocephalic, atraumatic.   Neck: Supple. No JVD   Respiratory: Breathing non-labored. Lungs clear to auscultation bilaterally. No crackles, wheezes, rhonchi, or rales.  Cardiovascular:  Regular rate and rhythm, normal S1 and S2. No murmur, rub, or gallop.  Skin: Warm, dry. No rashes, cyanosis, or xanthelasma.  Extremities: No edema.  Neurologic: No gross motor deficits. Alert, awake, and oriented to person, place and time.  Psychiatric: Affect appropriate.        Recent Lab Results:  LIPID RESULTS:  Lab Results   Component Value Date    CHOL 170 08/03/2023    CHOL 210 " "(H) 02/04/2021    HDL 65 08/03/2023    HDL 94 02/04/2021    LDL 64 08/03/2023    LDL 95 02/04/2021    TRIG 205 (H) 08/03/2023    TRIG 105 02/04/2021       LIVER ENZYME RESULTS:  No results found for: \"AST\", \"ALT\"    CBC RESULTS:  Lab Results   Component Value Date    WBC 7.6 02/13/2024    WBC 17.9 (H) 07/25/2007    RBC 4.85 02/13/2024    RBC 4.70 07/25/2007    HGB 11.8 02/13/2024    HGB 9.1 (L) 07/26/2007    HCT 35.8 02/13/2024    HCT 28.0 (L) 07/26/2007    MCV 74 (L) 02/13/2024    MCV 80 07/25/2007    MCH 24.3 (L) 02/13/2024    MCH 26.6 07/25/2007    MCHC 33.0 02/13/2024    MCHC 33.3 07/25/2007    RDW 14.6 02/13/2024    RDW 13.8 07/25/2007     02/13/2024     (L) 07/25/2007       BMP RESULTS:  Lab Results   Component Value Date     02/13/2024     02/04/2021    POTASSIUM 3.9 02/13/2024    POTASSIUM 3.9 07/13/2022    POTASSIUM 4.1 02/04/2021    CHLORIDE 105 02/13/2024    CHLORIDE 108 07/13/2022    CHLORIDE 108 02/04/2021    CO2 27 02/13/2024    CO2 26 07/13/2022    CO2 27 02/04/2021    ANIONGAP 8 02/13/2024    ANIONGAP 5 07/13/2022    ANIONGAP 1 (L) 02/04/2021    GLC 88 02/13/2024    GLC 87 07/13/2022    GLC 79 02/04/2021    BUN 9.8 02/13/2024    BUN 10 07/13/2022    BUN 10 02/04/2021    CR 0.69 02/13/2024    CR 0.62 02/04/2021    GFRESTIMATED >90 02/13/2024    GFRESTIMATED >90 02/04/2021    GFRESTBLACK >90 02/04/2021    DION 9.1 02/13/2024    DION 8.9 02/04/2021        A1C RESULTS:  No results found for: \"A1C\"    INR RESULTS:  No results found for: \"INR\"        CC  Lang Arshad MD  [address]  [place] [id number]                "

## 2024-03-13 NOTE — PATIENT INSTRUCTIONS
Call my nurse with any questions or concerns:  [phone]  *If you have concerns after hours, please call [phone], option 2 to speak with on call Cardiologist.    No changes  Please call with any concerns

## 2024-03-13 NOTE — LETTER
"3/13/2024    Kim Fraser, APRN CNP  [address] [facility] [address]    RE: Michael Marmolejo       Dear Colleague,     I had the pleasure of seeing Michael Marmolejo in the [facility].  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  PVC burden 37% on AV blocking agents.  Successful RVOT ablation 2/13/2024 with Dr. Hennessy.  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  She has a history of high PVC burden.  Initially thought she was not symptomatic, but since she had her RVOT PVC ablation she is surprised at how much her energy level has improved.  Now walking up to 2 miles a day without symptoms of shortness of breath or fatigue.  Patient states right femoral access is well-healed.  Denies chest pain, orthopnea, PND, syncope, lower extremity edema.  She is not on any AV blocking agents.        Diagnotic studies:  Twelve-lead EKG today shows sinus rhythm.  No PVCs noted  24-hour Holter monitor 2/22/2024 showed sinus rhythm with an average heart rate of 80 bpm, max 117 bpm minimum of 60 bpm.  No pauses and 1 isolated PAC noted.  No PVCs noted.  Echocardiogram 8/2023: EF 50 to 55%.  No valvular abnormalities noted.         Plan:   1. frequent PVCs  Successful RVOT ablation  Showed patient her follow-up EKG compared to her previous which showed bigeminal PVCs.    Holter monitor reviewed which showed no PVCs.  Limited echo scheduled for next week to reassess LV function post ablation.    2. mild secondary cardiomyopathy  Thought to be secondary to elevated PVC burden.    Limited echo scheduled next week    Patient is scheduled to see Dr. Dennison " at the end of the week.  I did recommend trying to push the appointment out 1 to 2 months.  She would like to see her for general cardiology.    She will see EP as needed.  I appreciate being involved in her care.  Please feel free to contact us if you have questions or concerns regarding today's assessment and plan.    Mali Beard, NP, APRN CNP      Today's clinic visit entailed:  Review of the result(s) of each unique test - EKG and Holter  15  minutes spent by me on the date of the encounter doing chart review, review of test results, patient visit, documentation, and discussion with family   Provider  Link to [facility] Help Grid     The level of medical decision making during this visit was of moderate complexity.      Orders Placed This Encounter   Procedures    EKG 12-lead complete w/read - Clinics (performed today)     No orders of the defined types were placed in this encounter.    There are no discontinued medications.      Encounter Diagnosis   Name Primary?    PVC's (premature ventricular contractions)        CURRENT MEDICATIONS:  Current Outpatient Medications   Medication Sig Dispense Refill    Calcium Carbonate (CALCIUM 600 PO)       Cholecalciferol (D3 VITAMIN PO)       clobetasol (TEMOVATE) 0.05 % external cream Apply twice daily for two weeks to rash then after use on Saturday and Sunday. 60 g 5    Multiple Vitamins-Minerals (MULTIVITAMIN GUMMIES ADULT PO)       norgestimate-ethinyl estradiol (ORTHO-CYCLEN) 0.25-35 MG-MCG tablet Take 1 tablet by mouth daily 84 tablet 3    tacrolimus (PROTOPIC) 0.1 % external ointment After two weeks of clobetasol use Monday through Friday. 60 g 5    betamethasone dipropionate (DIPROSONE) 0.05 % external cream Apply topically 2 times daily (Patient not taking: Reported on 3/13/2024) 45 g 2    triamcinolone (KENALOG) 0.1 % external cream Apply topically 2 times daily (Patient not taking: Reported on 3/13/2024) 80 g 3       ALLERGIES     Allergies   Allergen Reactions     "Metoprolol Muscle Pain (Myalgia) and Fatigue       PAST MEDICAL HISTORY:  History reviewed. No pertinent past medical history.    PAST SURGICAL HISTORY:  Past Surgical History:   Procedure Laterality Date    C/SECTION, LOW TRANSVERSE  07/01/2007    EP ABLATION PVC N/A 2/13/2024    Procedure: Ablation Premature Ventricular Contractions;  Surgeon: Lang Hennessy MD;  Location:  HEART CARDIAC CATH LAB       FAMILY HISTORY:  Family History   Problem Relation Age of Onset    Asthma Mother     Cancer Father         liver, ETOH abuse       SOCIAL HISTORY:  Social History     Socioeconomic History    Marital status:      Spouse name: None    Number of children: 1    Years of education: None    Highest education level: None   Occupational History     Employer: NONE    Tobacco Use    Smoking status: Never    Smokeless tobacco: Never   Vaping Use    Vaping Use: Never used   Substance and Sexual Activity    Alcohol use: Yes     Comment: rare    Drug use: No    Sexual activity: Yes     Partners: Male     Birth control/protection: Pill       Review of Systems:  Skin:        Eyes:       ENT:       Respiratory:  Negative    Cardiovascular:  Negative    Gastroenterology:      Genitourinary:       Musculoskeletal:       Neurologic:       Psychiatric:       Heme/Lymph/Imm:       Endocrine:  Negative thyroid disorder;diabetes    Physical Exam:    Vitals: /85   Pulse 86   Ht 1.6 m (5' 3\")   Wt 71.7 kg (158 lb)   SpO2 97%   BMI 27.99 kg/m    Constitutional: Well nourished and in no apparent distress.  Eyes: Pupils equal, round. Sclerae anicteric.   HEENT: Normocephalic, atraumatic.   Neck: Supple. No JVD   Respiratory: Breathing non-labored. Lungs clear to auscultation bilaterally. No crackles, wheezes, rhonchi, or rales.  Cardiovascular:  Regular rate and rhythm, normal S1 and S2. No murmur, rub, or gallop.  Skin: Warm, dry. No rashes, cyanosis, or xanthelasma.  Extremities: No edema.  Neurologic: No gross motor " "deficits. Alert, awake, and oriented to person, place and time.  Psychiatric: Affect appropriate.        Recent Lab Results:  LIPID RESULTS:  Lab Results   Component Value Date    CHOL 170 08/03/2023    CHOL 210 (H) 02/04/2021    HDL 65 08/03/2023    HDL 94 02/04/2021    LDL 64 08/03/2023    LDL 95 02/04/2021    TRIG 205 (H) 08/03/2023    TRIG 105 02/04/2021       LIVER ENZYME RESULTS:  No results found for: \"AST\", \"ALT\"    CBC RESULTS:  Lab Results   Component Value Date    WBC 7.6 02/13/2024    WBC 17.9 (H) 07/25/2007    RBC 4.85 02/13/2024    RBC 4.70 07/25/2007    HGB 11.8 02/13/2024    HGB 9.1 (L) 07/26/2007    HCT 35.8 02/13/2024    HCT 28.0 (L) 07/26/2007    MCV 74 (L) 02/13/2024    MCV 80 07/25/2007    MCH 24.3 (L) 02/13/2024    MCH 26.6 07/25/2007    MCHC 33.0 02/13/2024    MCHC 33.3 07/25/2007    RDW 14.6 02/13/2024    RDW 13.8 07/25/2007     02/13/2024     (L) 07/25/2007       BMP RESULTS:  Lab Results   Component Value Date     02/13/2024     02/04/2021    POTASSIUM 3.9 02/13/2024    POTASSIUM 3.9 07/13/2022    POTASSIUM 4.1 02/04/2021    CHLORIDE 105 02/13/2024    CHLORIDE 108 07/13/2022    CHLORIDE 108 02/04/2021    CO2 27 02/13/2024    CO2 26 07/13/2022    CO2 27 02/04/2021    ANIONGAP 8 02/13/2024    ANIONGAP 5 07/13/2022    ANIONGAP 1 (L) 02/04/2021    GLC 88 02/13/2024    GLC 87 07/13/2022    GLC 79 02/04/2021    BUN 9.8 02/13/2024    BUN 10 07/13/2022    BUN 10 02/04/2021    CR 0.69 02/13/2024    CR 0.62 02/04/2021    GFRESTIMATED >90 02/13/2024    GFRESTIMATED >90 02/04/2021    GFRESTBLACK >90 02/04/2021    DION 9.1 02/13/2024    DION 8.9 02/04/2021        A1C RESULTS:  No results found for: \"A1C\"    INR RESULTS:  No results found for: \"INR\"        CC  Lang Jacky Hennessy MD  [address]  [place] [address]    Thank you for allowing me to participate in the care of your patient.      Sincerely,     Mali Beard NP, APRN CNP     [facility]

## 2024-04-03 ENCOUNTER — HOSPITAL ENCOUNTER (OUTPATIENT)
Dept: CARDIOLOGY | Facility: CLINIC | Age: 49
Discharge: HOME | End: 2024-04-03
Attending: INTERNAL MEDICINE | Admitting: INTERNAL MEDICINE
Payer: COMMERCIAL

## 2024-04-03 DIAGNOSIS — I49.3 PVC'S (PREMATURE VENTRICULAR CONTRACTIONS): ICD-10-CM

## 2024-04-03 DIAGNOSIS — I42.9 CARDIOMYOPATHY, UNSPECIFIED TYPE (H): ICD-10-CM

## 2024-04-03 PROCEDURE — 93325 DOPPLER ECHO COLOR FLOW MAPG: CPT | Mod: 26 | Performed by: INTERNAL MEDICINE

## 2024-04-03 PROCEDURE — 93325 DOPPLER ECHO COLOR FLOW MAPG: CPT

## 2024-04-03 PROCEDURE — 93308 TTE F-UP OR LMTD: CPT | Mod: 26 | Performed by: INTERNAL MEDICINE

## 2024-04-03 PROCEDURE — 93321 DOPPLER ECHO F-UP/LMTD STD: CPT | Mod: 26 | Performed by: INTERNAL MEDICINE

## 2024-05-21 ENCOUNTER — OFFICE VISIT (OUTPATIENT)
Dept: CARDIOLOGY | Facility: CLINIC | Age: 49
End: 2024-05-21
Attending: NURSE PRACTITIONER
Payer: COMMERCIAL

## 2024-05-21 VITALS
DIASTOLIC BLOOD PRESSURE: 89 MMHG | SYSTOLIC BLOOD PRESSURE: 129 MMHG | RESPIRATION RATE: 16 BRPM | HEART RATE: 71 BPM | HEIGHT: 63 IN | BODY MASS INDEX: 27.75 KG/M2 | WEIGHT: 156.6 LBS | OXYGEN SATURATION: 97 %

## 2024-05-21 DIAGNOSIS — I42.8 OTHER CARDIOMYOPATHY (H): ICD-10-CM

## 2024-05-21 DIAGNOSIS — R07.9 CHEST PAIN, UNSPECIFIED TYPE: ICD-10-CM

## 2024-05-21 DIAGNOSIS — I42.9 CARDIOMYOPATHY, UNSPECIFIED TYPE (H): ICD-10-CM

## 2024-05-21 DIAGNOSIS — I49.3 PVC'S (PREMATURE VENTRICULAR CONTRACTIONS): ICD-10-CM

## 2024-05-21 PROCEDURE — 99213 OFFICE O/P EST LOW 20 MIN: CPT | Performed by: INTERNAL MEDICINE

## 2024-05-21 NOTE — PROGRESS NOTES
HPI:     This is a 49 yr old with no major PMH here for follow up. She saw me initially for frequent PVCs at 37% refractory to beta blocker therapy so is s/p RVOT ablation 2/13/2024 and has done well since. Her echo showed normalization of her LVEF.     SHe feels great. Is planning to start exercising soon and ramping up activity. Denies palpitations, dizziness, syncope. BP well controlled. Here with her  who is also my patient.       Plan:    1. Follow up one year with me     Saumya Dennison MD         PAST MEDICAL HISTORY  See above    CURRENT MEDICATIONS  Current Outpatient Medications   Medication Sig Dispense Refill    Calcium Carbonate (CALCIUM 600 PO)       Cholecalciferol (D3 VITAMIN PO)       clobetasol (TEMOVATE) 0.05 % external cream Apply twice daily for two weeks to rash then after use on Saturday and Sunday. 60 g 5    Multiple Vitamins-Minerals (MULTIVITAMIN GUMMIES ADULT PO)       norgestimate-ethinyl estradiol (ORTHO-CYCLEN) 0.25-35 MG-MCG tablet Take 1 tablet by mouth daily 84 tablet 3    tacrolimus (PROTOPIC) 0.1 % external ointment After two weeks of clobetasol use Monday through Friday. 60 g 5    betamethasone dipropionate (DIPROSONE) 0.05 % external cream Apply topically 2 times daily (Patient not taking: Reported on 3/13/2024) 45 g 2    triamcinolone (KENALOG) 0.1 % external cream Apply topically 2 times daily (Patient not taking: Reported on 3/13/2024) 80 g 3       PAST SURGICAL HISTORY:  Past Surgical History:   Procedure Laterality Date    C/SECTION, LOW TRANSVERSE  07/01/2007    EP ABLATION PVC N/A 2/13/2024    Procedure: Ablation Premature Ventricular Contractions;  Surgeon: Lang Hennessy MD;  Location:  HEART CARDIAC CATH LAB       ALLERGIES     Allergies   Allergen Reactions    Metoprolol Muscle Pain (Myalgia) and Fatigue       FAMILY HISTORY  Family History   Problem Relation Age of Onset    Asthma Mother     Cancer Father         liver, ETOH abuse  "      VAS    SOCIAL HISTORY  Social History     Socioeconomic History    Marital status:      Spouse name: Not on file    Number of children: 1    Years of education: Not on file    Highest education level: Not on file   Occupational History     Employer: NONE    Tobacco Use    Smoking status: Never    Smokeless tobacco: Never   Vaping Use    Vaping status: Never Used   Substance and Sexual Activity    Alcohol use: Yes     Comment: rare    Drug use: No    Sexual activity: Yes     Partners: Male     Birth control/protection: Pill   Other Topics Concern    Parent/sibling w/ CABG, MI or angioplasty before 65F 55M? Not Asked   Social History Narrative    Not on file     Social Determinants of Health     Financial Resource Strain: Not on file   Food Insecurity: Not on file   Transportation Needs: Not on file   Physical Activity: Not on file   Stress: Not on file   Social Connections: Not on file   Interpersonal Safety: Not on file   Housing Stability: Not on file       ROS:   Constitutional: No fever, chills, or sweats. No weight gain/loss   ENT: No visual disturbance, ear ache, epistaxis, sore throat  Allergies/Immunologic: Negative  Respiratory: No cough, hemoptysia  Cardiovascular: As per HPI  GI: No nausea, vomiting, hematemesis, melena, or hematochezia  : No urinary frequency, dysuria, or hematuria  Integument: Negative  Psychiatric: Negative  Neuro: Negative  Endocrinology: Negative   Musculoskeletal: Negative  Vascular: No walking impairment, claudication, ischemic rest pain or nonhealing wounds    EXAM:  /89 (BP Location: Left arm, Patient Position: Sitting, Cuff Size: Adult Regular)   Pulse 71   Resp 16   Ht 1.6 m (5' 3\")   Wt 71 kg (156 lb 9.6 oz)   LMP 04/25/2024 (Exact Date)   SpO2 97%   BMI 27.74 kg/m    In general, the patient is a pleasant female in no apparent distress.    HEENT: NC/AT.  PERRLA.  EOMI.  Sclerae white, not injected.  Nares clear.  Pharynx without erythema or exudate.  " "Dentition intact.    Neck: No adenopathy.  No thyromegaly. Carotids +2/2 bilaterally without bruits.  No jugular venous distension.   Heart: RRR. Normal S1, S2 splits physiologically. No murmur, rub, click, or gallop. The PMI is in the 5th ICS in the midclavicular line. There is no heave.    Lungs: CTA.  No ronchi, wheezes, rales.  No dullness to percussion.   Abdomen: Soft, nontender, nondistended. No organomegaly. No AAA.  No bruits.   Extremities: No clubbing, cyanosis, or edema.  No wounds. No varicose veins signs of chronic venous insufficiency.   Vascular: No bruits are noted.    Labs:  LIPID RESULTS:  Lab Results   Component Value Date    CHOL 170 08/03/2023    CHOL 210 (H) 02/04/2021    HDL 65 08/03/2023    HDL 94 02/04/2021    LDL 64 08/03/2023    LDL 95 02/04/2021    TRIG 205 (H) 08/03/2023    TRIG 105 02/04/2021    NHDL 105 08/03/2023    NHDL 116 02/04/2021       LIVER ENZYME RESULTS:  No results found for: \"AST\", \"ALT\"    CBC RESULTS:  Lab Results   Component Value Date    WBC 7.6 02/13/2024    WBC 17.9 (H) 07/25/2007    RBC 4.85 02/13/2024    RBC 4.70 07/25/2007    HGB 11.8 02/13/2024    HGB 9.1 (L) 07/26/2007    HCT 35.8 02/13/2024    HCT 28.0 (L) 07/26/2007    MCV 74 (L) 02/13/2024    MCV 80 07/25/2007    MCH 24.3 (L) 02/13/2024    MCH 26.6 07/25/2007    MCHC 33.0 02/13/2024    MCHC 33.3 07/25/2007    RDW 14.6 02/13/2024    RDW 13.8 07/25/2007     02/13/2024     (L) 07/25/2007       BMP RESULTS:  Lab Results   Component Value Date     02/13/2024     02/04/2021    POTASSIUM 3.9 02/13/2024    POTASSIUM 3.9 07/13/2022    POTASSIUM 4.1 02/04/2021    CHLORIDE 105 02/13/2024    CHLORIDE 108 07/13/2022    CHLORIDE 108 02/04/2021    CO2 27 02/13/2024    CO2 26 07/13/2022    CO2 27 02/04/2021    ANIONGAP 8 02/13/2024    ANIONGAP 5 07/13/2022    ANIONGAP 1 (L) 02/04/2021    GLC 88 02/13/2024    GLC 87 07/13/2022    GLC 79 02/04/2021    BUN 9.8 02/13/2024    BUN 10 07/13/2022    BUN 10 " "02/04/2021    CR 0.69 02/13/2024    CR 0.62 02/04/2021    GFRESTIMATED >90 02/13/2024    GFRESTIMATED >90 02/04/2021    GFRESTBLACK >90 02/04/2021    DION 9.1 02/13/2024    DION 8.9 02/04/2021        A1C RESULTS:  No results found for: \"A1C\"      "

## 2024-05-21 NOTE — LETTER
5/21/2024    Kim Fraser, HOA CNP  [address] [facility] [place] [address]    RE: Sheliakalpesh Carlos Dobbscha       Dear Colleague,     I had the pleasure of seeing Michael Marmolejo in the [facility].          HPI:     This is a 49 yr old with no major PMH here for follow up. She saw me initially for frequent PVCs at 37% refractory to beta blocker therapy so is s/p RVOT ablation 2/13/2024 and has done well since. Her echo showed normalization of her LVEF.     SHe feels great. Is planning to start exercising soon and ramping up activity. Denies palpitations, dizziness, syncope. BP well controlled. Here with her  who is also my patient.       Plan:    1. Follow up one year with me     Saumya Dennison MD         PAST MEDICAL HISTORY  See above    CURRENT MEDICATIONS  Current Outpatient Medications   Medication Sig Dispense Refill    Calcium Carbonate (CALCIUM 600 PO)       Cholecalciferol (D3 VITAMIN PO)       clobetasol (TEMOVATE) 0.05 % external cream Apply twice daily for two weeks to rash then after use on Saturday and Sunday. 60 g 5    Multiple Vitamins-Minerals (MULTIVITAMIN GUMMIES ADULT PO)       norgestimate-ethinyl estradiol (ORTHO-CYCLEN) 0.25-35 MG-MCG tablet Take 1 tablet by mouth daily 84 tablet 3    tacrolimus (PROTOPIC) 0.1 % external ointment After two weeks of clobetasol use Monday through Friday. 60 g 5    betamethasone dipropionate (DIPROSONE) 0.05 % external cream Apply topically 2 times daily (Patient not taking: Reported on 3/13/2024) 45 g 2    triamcinolone (KENALOG) 0.1 % external cream Apply topically 2 times daily (Patient not taking: Reported on 3/13/2024) 80 g 3       PAST SURGICAL HISTORY:  Past Surgical History:   Procedure Laterality Date    C/SECTION, LOW TRANSVERSE  07/01/2007    EP ABLATION PVC N/A 2/13/2024    Procedure: Ablation Premature Ventricular Contractions;  Surgeon: Lang Hennessy MD;  [facility]: [facility] CARDIAC CATH LAB       ALLERGIES     Allergies  "  Allergen Reactions    Metoprolol Muscle Pain (Myalgia) and Fatigue       FAMILY HISTORY  Family History   Problem Relation Age of Onset    Asthma Mother     Cancer Father         liver, ETOH abuse       VAS    SOCIAL HISTORY  Social History     Socioeconomic History    Marital status:      Spouse name: Not on file    Number of children: 1    Years of education: Not on file    Highest education level: Not on file   Occupational History     Employer: NONE    Tobacco Use    Smoking status: Never    Smokeless tobacco: Never   Vaping Use    Vaping status: Never Used   Substance and Sexual Activity    Alcohol use: Yes     Comment: rare    Drug use: No    Sexual activity: Yes     Partners: Male     Birth control/protection: Pill   Other Topics Concern    Parent/sibling w/ CABG, MI or angioplasty before 65F 55M? Not Asked   Social History Narrative    Not on file     Social Determinants of Health     Financial Resource Strain: Not on file   Food Insecurity: Not on file   Transportation Needs: Not on file   Physical Activity: Not on file   Stress: Not on file   Social Connections: Not on file   Interpersonal Safety: Not on file   Housing Stability: Not on file       ROS:   Constitutional: No fever, chills, or sweats. No weight gain/loss   ENT: No visual disturbance, ear ache, epistaxis, sore throat  Allergies/Immunologic: Negative  Respiratory: No cough, hemoptysia  Cardiovascular: As per HPI  GI: No nausea, vomiting, hematemesis, melena, or hematochezia  : No urinary frequency, dysuria, or hematuria  Integument: Negative  Psychiatric: Negative  Neuro: Negative  Endocrinology: Negative   Musculoskeletal: Negative  Vascular: No walking impairment, claudication, ischemic rest pain or nonhealing wounds    EXAM:  /89 (BP Location: Left arm, Patient Position: Sitting, Cuff Size: Adult Regular)   Pulse 71   Resp 16   Ht 1.6 m (5' 3\")   Wt 71 kg (156 lb 9.6 oz)   LMP 04/25/2024 (Exact Date)   SpO2 97%   BMI " "27.74 kg/m    In general, the patient is a pleasant female in no apparent distress.    HEENT: NC/AT.  PERRLA.  EOMI.  Sclerae white, not injected.  Nares clear.  Pharynx without erythema or exudate.  Dentition intact.    Neck: No adenopathy.  No thyromegaly. Carotids +2/2 bilaterally without bruits.  No jugular venous distension.   Heart: RRR. Normal S1, S2 splits physiologically. No murmur, rub, click, or gallop. The PMI is in the 5th ICS in the midclavicular line. There is no heave.    Lungs: CTA.  No ronchi, wheezes, rales.  No dullness to percussion.   Abdomen: Soft, nontender, nondistended. No organomegaly. No AAA.  No bruits.   Extremities: No clubbing, cyanosis, or edema.  No wounds. No varicose veins signs of chronic venous insufficiency.   Vascular: No bruits are noted.    Labs:  LIPID RESULTS:  Lab Results   Component Value Date    CHOL 170 08/03/2023    CHOL 210 (H) 02/04/2021    HDL 65 08/03/2023    HDL 94 02/04/2021    LDL 64 08/03/2023    LDL 95 02/04/2021    TRIG 205 (H) 08/03/2023    TRIG 105 02/04/2021    NHDL 105 08/03/2023    NHDL 116 02/04/2021       LIVER ENZYME RESULTS:  No results found for: \"AST\", \"ALT\"    CBC RESULTS:  Lab Results   Component Value Date    WBC 7.6 02/13/2024    WBC 17.9 (H) 07/25/2007    RBC 4.85 02/13/2024    RBC 4.70 07/25/2007    HGB 11.8 02/13/2024    HGB 9.1 (L) 07/26/2007    HCT 35.8 02/13/2024    HCT 28.0 (L) 07/26/2007    MCV 74 (L) 02/13/2024    MCV 80 07/25/2007    MCH 24.3 (L) 02/13/2024    MCH 26.6 07/25/2007    MCHC 33.0 02/13/2024    MCHC 33.3 07/25/2007    RDW 14.6 02/13/2024    RDW 13.8 07/25/2007     02/13/2024     (L) 07/25/2007       BMP RESULTS:  Lab Results   Component Value Date     02/13/2024     02/04/2021    POTASSIUM 3.9 02/13/2024    POTASSIUM 3.9 07/13/2022    POTASSIUM 4.1 02/04/2021    CHLORIDE 105 02/13/2024    CHLORIDE 108 07/13/2022    CHLORIDE 108 02/04/2021    CO2 27 02/13/2024    CO2 26 07/13/2022    CO2 27 " "02/04/2021    ANIONGAP 8 02/13/2024    ANIONGAP 5 07/13/2022    ANIONGAP 1 (L) 02/04/2021    GLC 88 02/13/2024    GLC 87 07/13/2022    GLC 79 02/04/2021    BUN 9.8 02/13/2024    BUN 10 07/13/2022    BUN 10 02/04/2021    CR 0.69 02/13/2024    CR 0.62 02/04/2021    GFRESTIMATED >90 02/13/2024    GFRESTIMATED >90 02/04/2021    GFRESTBLACK >90 02/04/2021    DION 9.1 02/13/2024    DION 8.9 02/04/2021        A1C RESULTS:  No results found for: \"A1C\"        Thank you for allowing me to participate in the care of your patient.      Sincerely,     Saumya Dennison MD     [facility]  cc:   HOA Snyder CNP  [address] [place] [address]      "

## 2024-06-13 ENCOUNTER — OFFICE VISIT (OUTPATIENT)
Dept: DERMATOLOGY | Facility: CLINIC | Age: 49
End: 2024-06-13
Payer: COMMERCIAL

## 2024-06-13 DIAGNOSIS — L20.9 ATOPIC DERMATITIS, UNSPECIFIED TYPE: ICD-10-CM

## 2024-06-13 DIAGNOSIS — L28.1 PRURIGO NODULARIS: Primary | ICD-10-CM

## 2024-06-13 PROCEDURE — 99213 OFFICE O/P EST LOW 20 MIN: CPT | Performed by: PHYSICIAN ASSISTANT

## 2024-06-13 ASSESSMENT — PAIN SCALES - GENERAL: PAINLEVEL: NO PAIN (0)

## 2024-06-13 NOTE — NURSING NOTE
Chief Complaint   Patient presents with    Derm Problem     Rash- still present increased itchyness since Tuesday        There were no vitals filed for this visit.  Wt Readings from Last 1 Encounters:   05/21/24 71 kg (156 lb 9.6 oz)       Anne-Marie George LPN .................6/13/2024

## 2024-06-13 NOTE — LETTER
6/13/2024      Michael Marmolejo  [address] [facility] [id number]      Dear Colleague,    Thank you for referring your patient, Michael Marmolejo, to the [facility]. Please see a copy of my visit note below.    Michael Marmolejo is an extremely pleasant 49 year old year old female patient here today for rash on legs. She notes it has improved after alternating between clobetasol and protopic. She notes healing slowly, sometimes still itchy.  She notes has a flare recently when she was in the garden , mostly on legs, itchy. Patient has no other skin complaints today.  Remainder of the HPI, Meds, PMH, Allergies, FH, and SH was reviewed in chart.    No past medical history on file.    Past Surgical History:   Procedure Laterality Date     C/SECTION, LOW TRANSVERSE  07/01/2007     EP ABLATION PVC N/A 2/13/2024    Procedure: Ablation Premature Ventricular Contractions;  Surgeon: Lang Hennessy MD;  Location:  [facility]        Family History   Problem Relation Age of Onset     Asthma Mother      Cancer Father         liver, ETOH abuse       Social History     Socioeconomic History     Marital status:      Spouse name: Not on file     Number of children: 1     Years of education: Not on file     Highest education level: Not on file   Occupational History     Employer: NONE    Tobacco Use     Smoking status: Never     Smokeless tobacco: Never   Vaping Use     Vaping status: Never Used   Substance and Sexual Activity     Alcohol use: Yes     Comment: rare     Drug use: No     Sexual activity: Yes     Partners: Male     Birth control/protection: Pill   Other Topics Concern     Parent/sibling w/ CABG, MI or angioplasty before 65F 55M? Not Asked   Social History Narrative     Not on file     Social Determinants of Health     Financial Resource Strain: Not on file   Food Insecurity: Not on file   Transportation Needs: Not on file   Physical Activity: Not on file   Stress: Not on  file   Social Connections: Not on file   Interpersonal Safety: Not on file   Housing Stability: Not on file       Outpatient Encounter Medications as of 6/13/2024   Medication Sig Dispense Refill     betamethasone dipropionate (DIPROSONE) 0.05 % external cream Apply topically 2 times daily (Patient not taking: Reported on 3/13/2024) 45 g 2     Calcium Carbonate (CALCIUM 600 PO)        Cholecalciferol (D3 VITAMIN PO)        clobetasol (TEMOVATE) 0.05 % external cream Apply twice daily for two weeks to rash then after use on Saturday and Sunday. 60 g 5     Multiple Vitamins-Minerals (MULTIVITAMIN GUMMIES ADULT PO)        norgestimate-ethinyl estradiol (ORTHO-CYCLEN) 0.25-35 MG-MCG tablet Take 1 tablet by mouth daily 84 tablet 3     tacrolimus (PROTOPIC) 0.1 % external ointment After two weeks of clobetasol use Monday through Friday. 60 g 5     triamcinolone (KENALOG) 0.1 % external cream Apply topically 2 times daily (Patient not taking: Reported on 3/13/2024) 80 g 3     No facility-administered encounter medications on file as of 6/13/2024.             O:   NAD, WDWN, Alert & Oriented, Mood & Affect wnl, Vitals stable   Here today with her     LMP 04/25/2024 (Exact Date)    General appearance normal   Vitals stable   Alert, oriented and in no acute distress      Few Pink scaly excoriated scabbed papules all over both legs    PIH on legs       Eyes: Conjunctivae/lids:Normal     ENT: Lips: normal    MSK:Normal    Pulm: Breathing Normal    Neuro/Psych: Orientation:Alert and Orientedx3 ; Mood/Affect:normal   A/P:  1. Prurigo nodularis with atopic dermatitis   Discussed skin condition, improved, but recently flared with being in the garden.   Discussed to wear clothing to protect skin from potential allergens.   Recommend moisturizing twice daily.   Use dove sensitive cleanser.   Use vanicream and cerave  cream once or twice daily.    Apply tacrolimus on Monday through Friday and clobetasol on weekends.   If flared  can increase to clobetasol twice daily for two weeks.   They want to avoid dupixent at this time.       Again, thank you for allowing me to participate in the care of your patient.        Sincerely,        Alexsandra Fernandez PA-C

## 2024-06-13 NOTE — PROGRESS NOTES
Michael Marmolejo is an extremely pleasant 49 year old year old female patient here today for rash on legs. She notes it has improved after alternating between clobetasol and protopic. She notes healing slowly, sometimes still itchy.  She notes has a flare recently when she was in the garden , mostly on legs, itchy. Patient has no other skin complaints today.  Remainder of the HPI, Meds, PMH, Allergies, FH, and SH was reviewed in chart.    No past medical history on file.    Past Surgical History:   Procedure Laterality Date    C/SECTION, LOW TRANSVERSE  07/01/2007    EP ABLATION PVC N/A 2/13/2024    Procedure: Ablation Premature Ventricular Contractions;  Surgeon: Lang Hennessy MD;  Location:  [facility]        Family History   Problem Relation Age of Onset    Asthma Mother     Cancer Father         liver, ETOH abuse       Social History     Socioeconomic History    Marital status:      Spouse name: Not on file    Number of children: 1    Years of education: Not on file    Highest education level: Not on file   Occupational History     Employer: NONE    Tobacco Use    Smoking status: Never    Smokeless tobacco: Never   Vaping Use    Vaping status: Never Used   Substance and Sexual Activity    Alcohol use: Yes     Comment: rare    Drug use: No    Sexual activity: Yes     Partners: Male     Birth control/protection: Pill   Other Topics Concern    Parent/sibling w/ CABG, MI or angioplasty before 65F 55M? Not Asked   Social History Narrative    Not on file     Social Determinants of Health     Financial Resource Strain: Not on file   Food Insecurity: Not on file   Transportation Needs: Not on file   Physical Activity: Not on file   Stress: Not on file   Social Connections: Not on file   Interpersonal Safety: Not on file   Housing Stability: Not on file       Outpatient Encounter Medications as of 6/13/2024   Medication Sig Dispense Refill    betamethasone dipropionate (DIPROSONE) 0.05 % external  cream Apply topically 2 times daily (Patient not taking: Reported on 3/13/2024) 45 g 2    Calcium Carbonate (CALCIUM 600 PO)       Cholecalciferol (D3 VITAMIN PO)       clobetasol (TEMOVATE) 0.05 % external cream Apply twice daily for two weeks to rash then after use on Saturday and Sunday. 60 g 5    Multiple Vitamins-Minerals (MULTIVITAMIN GUMMIES ADULT PO)       norgestimate-ethinyl estradiol (ORTHO-CYCLEN) 0.25-35 MG-MCG tablet Take 1 tablet by mouth daily 84 tablet 3    tacrolimus (PROTOPIC) 0.1 % external ointment After two weeks of clobetasol use Monday through Friday. 60 g 5    triamcinolone (KENALOG) 0.1 % external cream Apply topically 2 times daily (Patient not taking: Reported on 3/13/2024) 80 g 3     No facility-administered encounter medications on file as of 6/13/2024.             O:   NAD, WDWN, Alert & Oriented, Mood & Affect wnl, Vitals stable   Here today with her     LMP 04/25/2024 (Exact Date)    General appearance normal   Vitals stable   Alert, oriented and in no acute distress      Few Pink scaly excoriated scabbed papules all over both legs    PIH on legs       Eyes: Conjunctivae/lids:Normal     ENT: Lips: normal    MSK:Normal    Pulm: Breathing Normal    Neuro/Psych: Orientation:Alert and Orientedx3 ; Mood/Affect:normal   A/P:  1. Prurigo nodularis with atopic dermatitis   Discussed skin condition, improved, but recently flared with being in the garden.   Discussed to wear clothing to protect skin from potential allergens.   Recommend moisturizing twice daily.   Use dove sensitive cleanser.   Use vanicream and cerave  cream once or twice daily.    Apply tacrolimus on Monday through Friday and clobetasol on weekends.   If flared can increase to clobetasol twice daily for two weeks.   They want to avoid dupixent at this time.

## 2024-06-15 DIAGNOSIS — Z30.41 ENCOUNTER FOR SURVEILLANCE OF CONTRACEPTIVE PILLS: ICD-10-CM

## 2024-06-18 RX ORDER — NORGESTIMATE AND ETHINYL ESTRADIOL 0.25-0.035
1 KIT ORAL DAILY
Qty: 84 TABLET | Refills: 0 | Status: SHIPPED | OUTPATIENT
Start: 2024-06-18 | End: 2024-09-09

## 2024-07-05 ENCOUNTER — PATIENT OUTREACH (OUTPATIENT)
Dept: CARE COORDINATION | Facility: CLINIC | Age: 49
End: 2024-07-05
Payer: COMMERCIAL

## 2024-07-19 ENCOUNTER — PATIENT OUTREACH (OUTPATIENT)
Dept: CARE COORDINATION | Facility: CLINIC | Age: 49
End: 2024-07-19
Payer: COMMERCIAL

## 2024-09-06 DIAGNOSIS — Z30.41 ENCOUNTER FOR SURVEILLANCE OF CONTRACEPTIVE PILLS: ICD-10-CM

## 2024-09-09 RX ORDER — NORGESTIMATE AND ETHINYL ESTRADIOL 0.25-0.035
1 KIT ORAL DAILY
Qty: 84 TABLET | Refills: 0 | Status: SHIPPED | OUTPATIENT
Start: 2024-09-09

## 2024-10-09 ENCOUNTER — OFFICE VISIT (OUTPATIENT)
Dept: FAMILY MEDICINE | Facility: CLINIC | Age: 49
End: 2024-10-09
Payer: COMMERCIAL

## 2024-10-09 VITALS
BODY MASS INDEX: 28.3 KG/M2 | DIASTOLIC BLOOD PRESSURE: 88 MMHG | TEMPERATURE: 98.3 F | WEIGHT: 153.8 LBS | HEART RATE: 82 BPM | OXYGEN SATURATION: 98 % | RESPIRATION RATE: 16 BRPM | SYSTOLIC BLOOD PRESSURE: 118 MMHG | HEIGHT: 62 IN

## 2024-10-09 DIAGNOSIS — Z12.11 SCREEN FOR COLON CANCER: ICD-10-CM

## 2024-10-09 DIAGNOSIS — Z00.00 ANNUAL PHYSICAL EXAM: Primary | ICD-10-CM

## 2024-10-09 DIAGNOSIS — Z30.41 ENCOUNTER FOR SURVEILLANCE OF CONTRACEPTIVE PILLS: ICD-10-CM

## 2024-10-09 DIAGNOSIS — R42 DIZZINESS: ICD-10-CM

## 2024-10-09 DIAGNOSIS — I42.8 OTHER CARDIOMYOPATHY (H): ICD-10-CM

## 2024-10-09 DIAGNOSIS — I50.9 CHRONIC CONGESTIVE HEART FAILURE, UNSPECIFIED HEART FAILURE TYPE (H): ICD-10-CM

## 2024-10-09 DIAGNOSIS — R71.8 MICROCYTOSIS: ICD-10-CM

## 2024-10-09 LAB
ALT SERPL W P-5'-P-CCNC: 27 U/L (ref 0–50)
ANION GAP SERPL CALCULATED.3IONS-SCNC: 12 MMOL/L (ref 7–15)
BASOPHILS # BLD AUTO: 0 10E3/UL (ref 0–0.2)
BASOPHILS NFR BLD AUTO: 1 %
BUN SERPL-MCNC: 8.2 MG/DL (ref 6–20)
CALCIUM SERPL-MCNC: 9.3 MG/DL (ref 8.8–10.4)
CHLORIDE SERPL-SCNC: 104 MMOL/L (ref 98–107)
CHOLEST SERPL-MCNC: 210 MG/DL
CREAT SERPL-MCNC: 0.68 MG/DL (ref 0.51–0.95)
EGFRCR SERPLBLD CKD-EPI 2021: >90 ML/MIN/1.73M2
EOSINOPHIL # BLD AUTO: 0.5 10E3/UL (ref 0–0.7)
EOSINOPHIL NFR BLD AUTO: 7 %
ERYTHROCYTE [DISTWIDTH] IN BLOOD BY AUTOMATED COUNT: 14.3 % (ref 10–15)
FASTING STATUS PATIENT QL REPORTED: YES
FASTING STATUS PATIENT QL REPORTED: YES
FERRITIN SERPL-MCNC: 142 NG/ML (ref 6–175)
GLUCOSE SERPL-MCNC: 85 MG/DL (ref 70–99)
HCO3 SERPL-SCNC: 24 MMOL/L (ref 22–29)
HCT VFR BLD AUTO: 39.2 % (ref 35–47)
HDLC SERPL-MCNC: 77 MG/DL
HGB BLD-MCNC: 12.5 G/DL (ref 11.7–15.7)
IMM GRANULOCYTES # BLD: 0 10E3/UL
IMM GRANULOCYTES NFR BLD: 0 %
IRON BINDING CAPACITY (ROCHE): 321 UG/DL (ref 240–430)
IRON SATN MFR SERPL: 23 % (ref 15–46)
IRON SERPL-MCNC: 73 UG/DL (ref 37–145)
LDLC SERPL CALC-MCNC: 90 MG/DL
LYMPHOCYTES # BLD AUTO: 2.3 10E3/UL (ref 0.8–5.3)
LYMPHOCYTES NFR BLD AUTO: 32 %
MCH RBC QN AUTO: 23.5 PG (ref 26.5–33)
MCHC RBC AUTO-ENTMCNC: 31.9 G/DL (ref 31.5–36.5)
MCV RBC AUTO: 74 FL (ref 78–100)
MONOCYTES # BLD AUTO: 0.5 10E3/UL (ref 0–1.3)
MONOCYTES NFR BLD AUTO: 6 %
NEUTROPHILS # BLD AUTO: 3.9 10E3/UL (ref 1.6–8.3)
NEUTROPHILS NFR BLD AUTO: 54 %
NONHDLC SERPL-MCNC: 133 MG/DL
PLATELET # BLD AUTO: 271 10E3/UL (ref 150–450)
POTASSIUM SERPL-SCNC: 3.9 MMOL/L (ref 3.4–5.3)
RBC # BLD AUTO: 5.33 10E6/UL (ref 3.8–5.2)
SODIUM SERPL-SCNC: 140 MMOL/L (ref 135–145)
TRIGL SERPL-MCNC: 216 MG/DL
WBC # BLD AUTO: 7.2 10E3/UL (ref 4–11)

## 2024-10-09 PROCEDURE — 99396 PREV VISIT EST AGE 40-64: CPT | Mod: 25 | Performed by: NURSE PRACTITIONER

## 2024-10-09 PROCEDURE — 80061 LIPID PANEL: CPT | Performed by: NURSE PRACTITIONER

## 2024-10-09 PROCEDURE — 82728 ASSAY OF FERRITIN: CPT | Performed by: NURSE PRACTITIONER

## 2024-10-09 PROCEDURE — 83550 IRON BINDING TEST: CPT | Performed by: NURSE PRACTITIONER

## 2024-10-09 PROCEDURE — 84460 ALANINE AMINO (ALT) (SGPT): CPT | Performed by: NURSE PRACTITIONER

## 2024-10-09 PROCEDURE — 99213 OFFICE O/P EST LOW 20 MIN: CPT | Mod: 25 | Performed by: NURSE PRACTITIONER

## 2024-10-09 PROCEDURE — 90656 IIV3 VACC NO PRSV 0.5 ML IM: CPT | Performed by: NURSE PRACTITIONER

## 2024-10-09 PROCEDURE — 83540 ASSAY OF IRON: CPT | Performed by: NURSE PRACTITIONER

## 2024-10-09 PROCEDURE — 36415 COLL VENOUS BLD VENIPUNCTURE: CPT | Performed by: NURSE PRACTITIONER

## 2024-10-09 PROCEDURE — 80048 BASIC METABOLIC PNL TOTAL CA: CPT | Performed by: NURSE PRACTITIONER

## 2024-10-09 PROCEDURE — 90471 IMMUNIZATION ADMIN: CPT | Performed by: NURSE PRACTITIONER

## 2024-10-09 PROCEDURE — 85025 COMPLETE CBC W/AUTO DIFF WBC: CPT | Performed by: NURSE PRACTITIONER

## 2024-10-09 RX ORDER — NORGESTIMATE AND ETHINYL ESTRADIOL 0.25-0.035
1 KIT ORAL DAILY
Qty: 84 TABLET | Refills: 3 | Status: SHIPPED | OUTPATIENT
Start: 2024-10-09

## 2024-10-09 SDOH — HEALTH STABILITY: PHYSICAL HEALTH: ON AVERAGE, HOW MANY DAYS PER WEEK DO YOU ENGAGE IN MODERATE TO STRENUOUS EXERCISE (LIKE A BRISK WALK)?: 3 DAYS

## 2024-10-09 SDOH — HEALTH STABILITY: PHYSICAL HEALTH: ON AVERAGE, HOW MANY MINUTES DO YOU ENGAGE IN EXERCISE AT THIS LEVEL?: 60 MIN

## 2024-10-09 ASSESSMENT — SOCIAL DETERMINANTS OF HEALTH (SDOH): HOW OFTEN DO YOU GET TOGETHER WITH FRIENDS OR RELATIVES?: PATIENT DECLINED

## 2024-10-09 ASSESSMENT — PAIN SCALES - GENERAL: PAINLEVEL: NO PAIN (0)

## 2024-10-09 NOTE — PROGRESS NOTES
Preventive Care Visit  [facility]  HOA Albrecht CNP, Family Medicine      Assessment & Plan     Annual physical exam  -normal exam     Chronic congestive heart failure, unspecified heart failure type (H)  -stable, following cardiologist   - ALT; Future  - Lipid panel reflex to direct LDL Non-fasting; Future  - BASIC METABOLIC PANEL; Future  - ALT  - Lipid panel reflex to direct LDL Non-fasting  - BASIC METABOLIC PANEL    Screen for colon cancer    - Fecal colorectal cancer screen FIT - Future (S+30); Future  - Fecal colorectal cancer screen FIT - Future (S+30)    Other cardiomyopathy (H)  -no new symptoms or concerns, patient following with cardiologist     Encounter for surveillance of contraceptive pills    - norgestimate-ethinyl estradiol (ORTHO-CYCLEN) 0.25-35 MG-MCG tablet; Take 1 tablet by mouth daily.    Dizziness  -reports 2-3 days of mild dizziness  -labs normal so far except for possible iron deficiency due to low MCV count, added ferritin and iron panel   - REVIEW OF HEALTH MAINTENANCE PROTOCOL ORDERS  - BASIC METABOLIC PANEL; Future  - CBC with platelets and differential; Future  - BASIC METABOLIC PANEL  - CBC with platelets and differential    Microcytosis    - Iron and iron binding capacity; Future  - Ferritin; Future  - Ferritin    Subjective   Aoy is a 49 year old, presenting for the following:  Physical        10/9/2024     9:47 AM   Additional Questions   Roomed by uli   Accompanied by self         10/9/2024     9:47 AM   Patient Reported Additional Medications   Patient reports taking the following new medications none        Health Care Directive  Patient does not have a Health Care Directive or Living Will: Advance Directive received and scanned. Click on Code in the patient header to view.    HPI        10/9/2024   General Health   How would you rate your overall physical health? Good   Feel stress (tense, anxious, or unable to sleep) Not at all             10/9/2024   Nutrition   Three or more servings of calcium each day? Yes   Diet: Regular (no restrictions)   How many servings of fruit and vegetables per day? (!) 2-3   How many sweetened beverages each day? 0-1            10/9/2024   Exercise   Days per week of moderate/strenous exercise 3 days   Average minutes spent exercising at this level 60 min            10/9/2024   Social Factors   Frequency of gathering with friends or relatives Patient declined   Worry food won't last until get money to buy more No   Food not last or not have enough money for food? No   Do you have housing? (Housing is defined as stable permanent housing and does not include staying ouside in a car, in a tent, in an abandoned building, in an overnight shelter, or couch-surfing.) Yes   Are you worried about losing your housing? No   Lack of transportation? No   Unable to get utilities (heat,electricity)? No            10/9/2024   Dental   Dentist two times every year? Yes            10/9/2024   TB Screening   Were you born outside of the [place]? Yes          Today's PHQ-2 Score:       3/13/2024     1:51 PM   PHQ-2 ( 1999 [employer])   Q1: Little interest or pleasure in doing things 0   Q2: Feeling down, depressed or hopeless 0   PHQ-2 Score 0         10/9/2024   Substance Use   Alcohol more than 3/day or more than 7/wk No   Do you use any other substances recreationally? No        Social History     Tobacco Use    Smoking status: Never    Smokeless tobacco: Never   Vaping Use    Vaping status: Never Used   Substance Use Topics    Alcohol use: Yes     Comment: rare    Drug use: No           9/13/2023   LAST FHS-7 RESULTS   1st degree relative breast or ovarian cancer No   Any relative bilateral breast cancer No   Any male have breast cancer No   Any ONE woman have BOTH breast AND ovarian cancer No   Any woman with breast cancer before 50yrs No   2 or more relatives with breast AND/OR ovarian cancer No   2 or more relatives with breast AND/OR  "bowel cancer No           Mammogram Screening - Mammogram every 1-2 years updated in Health Maintenance based on mutual decision making        10/9/2024   STI Screening   New sexual partner(s) since last STI/HIV test? No        History of abnormal Pap smear: No - age 30-64 HPV with reflex Pap every 5 years recommended        Latest Ref Rng & Units 7/13/2022    10:54 AM 1/2/2020     9:06 AM 1/2/2020     9:00 AM   PAP / HPV   PAP  Negative for Intraepithelial Lesion or Malignancy (NILM)      PAP (Historical)   NIL     HPV 16 DNA Negative Negative   Negative    HPV 18 DNA Negative Negative   Negative    Other HR HPV Negative Negative   Negative      ASCVD Risk   The 10-year ASCVD risk score (Tera REBOLLEDO, et al., 2019) is: 0.7%    Values used to calculate the score:      Age: 49 years      Sex: Female      Is Non- : No      Diabetic: No      Tobacco smoker: No      Systolic Blood Pressure: 118 mmHg      Is BP treated: No      HDL Cholesterol: 77 mg/dL      Total Cholesterol: 210 mg/dL        10/9/2024   Contraception/Family Planning   Questions about contraception or family planning No      Reviewed and updated as needed this visit by Provider    Allergies  Meds  Problems    Fam Hx                Review of Systems  Constitutional, HEENT, cardiovascular, pulmonary, gi and gu systems are negative, except as otherwise noted.     Objective    Exam  /88   Pulse 82   Temp 98.3  F (36.8  C) (Tympanic)   Resp 16   Ht 1.568 m (5' 1.75\")   Wt 69.8 kg (153 lb 12.8 oz)   SpO2 98%   BMI 28.36 kg/m     Estimated body mass index is 28.36 kg/m  as calculated from the following:    Height as of this encounter: 1.568 m (5' 1.75\").    Weight as of this encounter: 69.8 kg (153 lb 12.8 oz).    Physical Exam  GENERAL: alert and no distress  EYES: Eyes grossly normal to inspection, PERRL and conjunctivae and sclerae normal  HENT: ear canals and TM's normal, nose and mouth without ulcers or " lesions  NECK: no adenopathy, no asymmetry, masses, or scars  RESP: lungs clear to auscultation - no rales, rhonchi or wheezes  CV: regular rate and rhythm, normal S1 S2, no S3 or S4, no murmur, click or rub, no peripheral edema   MS: no gross musculoskeletal defects noted, no edema  SKIN: no suspicious lesions or rashes  NEURO: Normal strength and tone, mentation intact and speech normal  PSYCH: mentation appears normal, affect normal/bright        Signed Electronically by: HOA Albrecht CNP

## 2024-10-25 PROCEDURE — 82274 ASSAY TEST FOR BLOOD FECAL: CPT | Performed by: NURSE PRACTITIONER

## 2024-10-30 ENCOUNTER — APPOINTMENT (OUTPATIENT)
Dept: LAB | Facility: CLINIC | Age: 49
End: 2024-10-30
Payer: COMMERCIAL

## 2024-10-31 LAB — HEMOCCULT STL QL IA: NEGATIVE

## 2024-11-04 ENCOUNTER — MYC MEDICAL ADVICE (OUTPATIENT)
Dept: FAMILY MEDICINE | Facility: CLINIC | Age: 49
End: 2024-11-04
Payer: COMMERCIAL

## 2024-12-08 DIAGNOSIS — L30.9 ECZEMA, UNSPECIFIED TYPE: ICD-10-CM

## 2024-12-09 RX ORDER — BETAMETHASONE DIPROPIONATE 0.5 MG/G
CREAM TOPICAL 2 TIMES DAILY
Qty: 45 G | Refills: 0 | Status: SHIPPED | OUTPATIENT
Start: 2024-12-09

## 2025-02-03 DIAGNOSIS — L30.9 ECZEMA, UNSPECIFIED TYPE: ICD-10-CM

## 2025-02-03 RX ORDER — BETAMETHASONE DIPROPIONATE 0.5 MG/G
CREAM TOPICAL 2 TIMES DAILY
Qty: 15 G | Refills: 0 | Status: SHIPPED | OUTPATIENT
Start: 2025-02-03

## 2025-02-10 NOTE — Clinical Note
Attended Phase II Cardiac Rehab. No medication or health history changes reported. See [facility] for details.   Family has been updated.

## 2025-08-14 ENCOUNTER — PATIENT OUTREACH (OUTPATIENT)
Dept: CARE COORDINATION | Facility: CLINIC | Age: 50
End: 2025-08-14
Payer: COMMERCIAL

## (undated) DEVICE — DEFIB PRO-PADZ LVP LQD GEL ADULT 8900-2105-01

## (undated) DEVICE — INTRODUCER SHEATH GREEN 6.5FRX11CM .038IN PSI-6F-11-038ACT

## (undated) DEVICE — CATH EP 7FR X 115CM DECANAV CA

## (undated) DEVICE — INTRO CATH 12CM 8.5FR FST-CATH

## (undated) DEVICE — CLOSURE DEVICE 6FR VASC PROGLIDE MEDICATED SUTURE 12673-03

## (undated) DEVICE — PATCH CARTO 3 EXTERNAL REFERENCE 3D MAPPING CREFP6

## (undated) DEVICE — CATH RF CARD ABL BID JJ THERMO

## (undated) DEVICE — CATH EP CATH EP REPRO DAIG RSPN FX CRV DX EP C

## (undated) DEVICE — PACK EP SRG PROC LF DISP SAN32EPFSR

## (undated) RX ORDER — FENTANYL CITRATE 50 UG/ML
INJECTION, SOLUTION INTRAMUSCULAR; INTRAVENOUS
Status: DISPENSED
Start: 2024-02-13

## (undated) RX ORDER — LIDOCAINE HYDROCHLORIDE 10 MG/ML
INJECTION, SOLUTION EPIDURAL; INFILTRATION; INTRACAUDAL; PERINEURAL
Status: DISPENSED
Start: 2024-02-13

## (undated) RX ORDER — HEPARIN SODIUM 1000 [USP'U]/ML
INJECTION, SOLUTION INTRAVENOUS; SUBCUTANEOUS
Status: DISPENSED
Start: 2024-02-13